# Patient Record
Sex: FEMALE | Race: WHITE | Employment: OTHER | URBAN - METROPOLITAN AREA
[De-identification: names, ages, dates, MRNs, and addresses within clinical notes are randomized per-mention and may not be internally consistent; named-entity substitution may affect disease eponyms.]

---

## 2017-03-22 ENCOUNTER — COMMUNICATION - HEALTHEAST (OUTPATIENT)
Dept: FAMILY MEDICINE | Facility: CLINIC | Age: 52
End: 2017-03-22

## 2017-04-03 ENCOUNTER — COMMUNICATION - HEALTHEAST (OUTPATIENT)
Dept: FAMILY MEDICINE | Facility: CLINIC | Age: 52
End: 2017-04-03

## 2017-04-06 ENCOUNTER — OFFICE VISIT - HEALTHEAST (OUTPATIENT)
Dept: FAMILY MEDICINE | Facility: CLINIC | Age: 52
End: 2017-04-06

## 2017-04-06 DIAGNOSIS — K61.2 ABSCESS OF ANAL AND RECTAL REGIONS: ICD-10-CM

## 2017-04-06 DIAGNOSIS — Z78.9 HEALTH CARE HOME, ACTIVE CARE COORDINATION: ICD-10-CM

## 2017-04-06 DIAGNOSIS — N82.3 RECTO-VAGINAL FISTULA: ICD-10-CM

## 2017-04-06 DIAGNOSIS — K21.00 REFLUX ESOPHAGITIS: ICD-10-CM

## 2017-04-06 DIAGNOSIS — Z12.31 OTHER SCREENING MAMMOGRAM: ICD-10-CM

## 2017-04-06 RX ORDER — IBUPROFEN 200 MG
200 TABLET ORAL EVERY 6 HOURS PRN
Status: SHIPPED | COMMUNITY
Start: 2017-04-06 | End: 2023-06-12

## 2017-04-06 ASSESSMENT — MIFFLIN-ST. JEOR: SCORE: 1566.78

## 2017-04-10 ENCOUNTER — COMMUNICATION - HEALTHEAST (OUTPATIENT)
Dept: FAMILY MEDICINE | Facility: CLINIC | Age: 52
End: 2017-04-10

## 2017-04-14 ENCOUNTER — HOSPITAL ENCOUNTER (OUTPATIENT)
Dept: MAMMOGRAPHY | Facility: CLINIC | Age: 52
Discharge: HOME | End: 2017-04-14
Attending: FAMILY MEDICINE

## 2017-04-14 DIAGNOSIS — Z12.31 OTHER SCREENING MAMMOGRAM: ICD-10-CM

## 2017-04-25 ENCOUNTER — COMMUNICATION - HEALTHEAST (OUTPATIENT)
Dept: FAMILY MEDICINE | Facility: CLINIC | Age: 52
End: 2017-04-25

## 2017-05-04 ENCOUNTER — OFFICE VISIT - HEALTHEAST (OUTPATIENT)
Dept: FAMILY MEDICINE | Facility: CLINIC | Age: 52
End: 2017-05-04

## 2017-05-04 DIAGNOSIS — R12 HEARTBURN: ICD-10-CM

## 2017-05-04 ASSESSMENT — MIFFLIN-ST. JEOR: SCORE: 1553.18

## 2017-06-12 ENCOUNTER — RECORDS - HEALTHEAST (OUTPATIENT)
Dept: ADMINISTRATIVE | Facility: OTHER | Age: 52
End: 2017-06-12

## 2017-06-13 ENCOUNTER — RECORDS - HEALTHEAST (OUTPATIENT)
Dept: ADMINISTRATIVE | Facility: OTHER | Age: 52
End: 2017-06-13

## 2017-12-19 ENCOUNTER — OFFICE VISIT - HEALTHEAST (OUTPATIENT)
Dept: FAMILY MEDICINE | Facility: CLINIC | Age: 52
End: 2017-12-19

## 2017-12-19 DIAGNOSIS — M77.9 TENDONITIS: ICD-10-CM

## 2018-01-15 ENCOUNTER — AMBULATORY - HEALTHEAST (OUTPATIENT)
Dept: FAMILY MEDICINE | Facility: CLINIC | Age: 53
End: 2018-01-15

## 2018-01-15 ENCOUNTER — COMMUNICATION - HEALTHEAST (OUTPATIENT)
Dept: FAMILY MEDICINE | Facility: CLINIC | Age: 53
End: 2018-01-15

## 2018-01-15 DIAGNOSIS — N95.0 POST-MENOPAUSAL BLEEDING: ICD-10-CM

## 2018-01-19 ENCOUNTER — RECORDS - HEALTHEAST (OUTPATIENT)
Dept: ADMINISTRATIVE | Facility: OTHER | Age: 53
End: 2018-01-19

## 2018-02-12 ENCOUNTER — OFFICE VISIT - HEALTHEAST (OUTPATIENT)
Dept: FAMILY MEDICINE | Facility: CLINIC | Age: 53
End: 2018-02-12

## 2018-02-12 DIAGNOSIS — Z01.818 PREOP EXAMINATION: ICD-10-CM

## 2018-02-12 DIAGNOSIS — N95.0 POST-MENOPAUSAL BLEEDING: ICD-10-CM

## 2018-02-12 LAB
ALBUMIN UR-MCNC: NEGATIVE MG/DL
APPEARANCE UR: CLEAR
BILIRUB UR QL STRIP: NEGATIVE
COLOR UR AUTO: YELLOW
ERYTHROCYTE [DISTWIDTH] IN BLOOD BY AUTOMATED COUNT: 14 % (ref 11–14.5)
GLUCOSE UR STRIP-MCNC: NEGATIVE MG/DL
HCT VFR BLD AUTO: 38 % (ref 35–47)
HGB BLD-MCNC: 12.8 G/DL (ref 12–16)
HGB UR QL STRIP: ABNORMAL
KETONES UR STRIP-MCNC: NEGATIVE MG/DL
LEUKOCYTE ESTERASE UR QL STRIP: NEGATIVE
MCH RBC QN AUTO: 27.3 PG (ref 27–34)
MCHC RBC AUTO-ENTMCNC: 33.6 G/DL (ref 32–36)
MCV RBC AUTO: 81 FL (ref 80–100)
NITRATE UR QL: NEGATIVE
PH UR STRIP: 6 [PH] (ref 5–8)
PLATELET # BLD AUTO: 367 THOU/UL (ref 140–440)
PMV BLD AUTO: 6.9 FL (ref 7–10)
RBC # BLD AUTO: 4.67 MILL/UL (ref 3.8–5.4)
SP GR UR STRIP: 1.02 (ref 1–1.03)
UROBILINOGEN UR STRIP-ACNC: ABNORMAL
WBC: 12.1 THOU/UL (ref 4–11)

## 2018-02-12 ASSESSMENT — MIFFLIN-ST. JEOR: SCORE: 1562.25

## 2018-02-13 LAB
ANION GAP SERPL CALCULATED.3IONS-SCNC: 11 MMOL/L (ref 5–18)
ATRIAL RATE - MUSE: 93 BPM
BUN SERPL-MCNC: 11 MG/DL (ref 8–22)
CALCIUM SERPL-MCNC: 9.5 MG/DL (ref 8.5–10.5)
CHLORIDE BLD-SCNC: 101 MMOL/L (ref 98–107)
CO2 SERPL-SCNC: 26 MMOL/L (ref 22–31)
CREAT SERPL-MCNC: 0.72 MG/DL (ref 0.6–1.1)
DIASTOLIC BLOOD PRESSURE - MUSE: NORMAL MMHG
GFR SERPL CREATININE-BSD FRML MDRD: >60 ML/MIN/1.73M2
GLUCOSE BLD-MCNC: 119 MG/DL (ref 70–125)
INTERPRETATION ECG - MUSE: NORMAL
P AXIS - MUSE: 24 DEGREES
POTASSIUM BLD-SCNC: 4.1 MMOL/L (ref 3.5–5)
PR INTERVAL - MUSE: 112 MS
QRS DURATION - MUSE: 76 MS
QT - MUSE: 364 MS
QTC - MUSE: 452 MS
R AXIS - MUSE: -14 DEGREES
SODIUM SERPL-SCNC: 138 MMOL/L (ref 136–145)
SYSTOLIC BLOOD PRESSURE - MUSE: NORMAL MMHG
T AXIS - MUSE: 34 DEGREES
VENTRICULAR RATE- MUSE: 93 BPM

## 2018-02-14 LAB — BACTERIA SPEC CULT: NORMAL

## 2018-02-26 ENCOUNTER — RECORDS - HEALTHEAST (OUTPATIENT)
Dept: ADMINISTRATIVE | Facility: OTHER | Age: 53
End: 2018-02-26

## 2018-04-23 ENCOUNTER — HOSPITAL ENCOUNTER (OUTPATIENT)
Dept: MAMMOGRAPHY | Facility: CLINIC | Age: 53
Discharge: HOME | End: 2018-04-23
Attending: FAMILY MEDICINE

## 2018-04-23 DIAGNOSIS — Z12.31 VISIT FOR SCREENING MAMMOGRAM: ICD-10-CM

## 2018-06-27 ENCOUNTER — COMMUNICATION - HEALTHEAST (OUTPATIENT)
Dept: FAMILY MEDICINE | Facility: CLINIC | Age: 53
End: 2018-06-27

## 2018-06-30 ENCOUNTER — AMBULATORY - HEALTHEAST (OUTPATIENT)
Dept: FAMILY MEDICINE | Facility: CLINIC | Age: 53
End: 2018-06-30

## 2018-07-14 ENCOUNTER — COMMUNICATION - HEALTHEAST (OUTPATIENT)
Dept: FAMILY MEDICINE | Facility: CLINIC | Age: 53
End: 2018-07-14

## 2018-07-14 DIAGNOSIS — K21.00 REFLUX ESOPHAGITIS: ICD-10-CM

## 2018-11-12 ENCOUNTER — AMBULATORY - HEALTHEAST (OUTPATIENT)
Dept: FAMILY MEDICINE | Facility: CLINIC | Age: 53
End: 2018-11-12

## 2018-11-12 ENCOUNTER — COMMUNICATION - HEALTHEAST (OUTPATIENT)
Dept: FAMILY MEDICINE | Facility: CLINIC | Age: 53
End: 2018-11-12

## 2018-12-13 ENCOUNTER — AMBULATORY - HEALTHEAST (OUTPATIENT)
Dept: FAMILY MEDICINE | Facility: CLINIC | Age: 53
End: 2018-12-13

## 2018-12-13 ENCOUNTER — COMMUNICATION - HEALTHEAST (OUTPATIENT)
Dept: FAMILY MEDICINE | Facility: CLINIC | Age: 53
End: 2018-12-13

## 2019-01-03 ENCOUNTER — AMBULATORY - HEALTHEAST (OUTPATIENT)
Dept: FAMILY MEDICINE | Facility: CLINIC | Age: 54
End: 2019-01-03

## 2019-01-03 ENCOUNTER — COMMUNICATION - HEALTHEAST (OUTPATIENT)
Dept: FAMILY MEDICINE | Facility: CLINIC | Age: 54
End: 2019-01-03

## 2019-01-03 DIAGNOSIS — K61.2 ABSCESS OF ANAL AND RECTAL REGIONS: ICD-10-CM

## 2019-01-06 ENCOUNTER — COMMUNICATION - HEALTHEAST (OUTPATIENT)
Dept: FAMILY MEDICINE | Facility: CLINIC | Age: 54
End: 2019-01-06

## 2019-01-06 DIAGNOSIS — K21.00 REFLUX ESOPHAGITIS: ICD-10-CM

## 2019-01-17 ENCOUNTER — COMMUNICATION - HEALTHEAST (OUTPATIENT)
Dept: FAMILY MEDICINE | Facility: CLINIC | Age: 54
End: 2019-01-17

## 2019-01-17 DIAGNOSIS — K61.2 ABSCESS OF ANAL AND RECTAL REGIONS: ICD-10-CM

## 2019-04-24 ENCOUNTER — COMMUNICATION - HEALTHEAST (OUTPATIENT)
Dept: FAMILY MEDICINE | Facility: CLINIC | Age: 54
End: 2019-04-24

## 2019-04-26 ENCOUNTER — COMMUNICATION - HEALTHEAST (OUTPATIENT)
Dept: FAMILY MEDICINE | Facility: CLINIC | Age: 54
End: 2019-04-26

## 2019-05-22 ENCOUNTER — OFFICE VISIT - HEALTHEAST (OUTPATIENT)
Dept: FAMILY MEDICINE | Facility: CLINIC | Age: 54
End: 2019-05-22

## 2019-05-22 DIAGNOSIS — K21.00 REFLUX ESOPHAGITIS: ICD-10-CM

## 2019-05-22 DIAGNOSIS — R05.9 COUGH: ICD-10-CM

## 2019-08-14 ENCOUNTER — COMMUNICATION - HEALTHEAST (OUTPATIENT)
Dept: FAMILY MEDICINE | Facility: CLINIC | Age: 54
End: 2019-08-14

## 2019-08-14 DIAGNOSIS — K61.2 ABSCESS OF ANAL AND RECTAL REGIONS: ICD-10-CM

## 2019-08-15 ENCOUNTER — AMBULATORY - HEALTHEAST (OUTPATIENT)
Dept: FAMILY MEDICINE | Facility: CLINIC | Age: 54
End: 2019-08-15

## 2019-08-15 DIAGNOSIS — K61.2 ABSCESS OF ANAL AND RECTAL REGIONS: ICD-10-CM

## 2019-08-19 ENCOUNTER — COMMUNICATION - HEALTHEAST (OUTPATIENT)
Dept: FAMILY MEDICINE | Facility: CLINIC | Age: 54
End: 2019-08-19

## 2019-08-19 DIAGNOSIS — K61.2 ABSCESS OF ANAL AND RECTAL REGIONS: ICD-10-CM

## 2019-12-04 ENCOUNTER — COMMUNICATION - HEALTHEAST (OUTPATIENT)
Dept: FAMILY MEDICINE | Facility: CLINIC | Age: 54
End: 2019-12-04

## 2019-12-04 DIAGNOSIS — K21.00 REFLUX ESOPHAGITIS: ICD-10-CM

## 2019-12-31 ENCOUNTER — COMMUNICATION - HEALTHEAST (OUTPATIENT)
Dept: FAMILY MEDICINE | Facility: CLINIC | Age: 54
End: 2019-12-31

## 2020-02-26 ENCOUNTER — OFFICE VISIT - HEALTHEAST (OUTPATIENT)
Dept: FAMILY MEDICINE | Facility: CLINIC | Age: 55
End: 2020-02-26

## 2020-02-26 DIAGNOSIS — K21.00 REFLUX ESOPHAGITIS: ICD-10-CM

## 2020-02-26 DIAGNOSIS — M79.641 PAIN OF RIGHT HAND: ICD-10-CM

## 2020-02-26 DIAGNOSIS — Z12.31 VISIT FOR SCREENING MAMMOGRAM: ICD-10-CM

## 2020-02-27 ENCOUNTER — AMBULATORY - HEALTHEAST (OUTPATIENT)
Dept: FAMILY MEDICINE | Facility: CLINIC | Age: 55
End: 2020-02-27

## 2020-02-27 ENCOUNTER — COMMUNICATION - HEALTHEAST (OUTPATIENT)
Dept: FAMILY MEDICINE | Facility: CLINIC | Age: 55
End: 2020-02-27

## 2020-02-27 DIAGNOSIS — K60.40 RECTAL FISTULA: ICD-10-CM

## 2020-08-31 ENCOUNTER — VIRTUAL VISIT (OUTPATIENT)
Dept: FAMILY MEDICINE | Facility: OTHER | Age: 55
End: 2020-08-31
Payer: COMMERCIAL

## 2020-08-31 PROCEDURE — 99421 OL DIG E/M SVC 5-10 MIN: CPT | Performed by: PHYSICIAN ASSISTANT

## 2020-08-31 NOTE — PROGRESS NOTES
"Date: 2020 08:51:36  Clinician: Marc Preston  Clinician NPI: [id number]  Patient: Rima De La Torre  Patient : 1965  Patient Address: [address] [place] [address]  Patient Phone: [phone]  Visit Protocol: Yeast infection  Patient Summary:  Rima is a 54 year old ( : 1965 ) female who initiated a Visit for a presumed vaginal yeast infection. When asked the question \"Please sign me up to receive news, health information and promotions from [employer].\", Rima responded \"No\".    Rima began noticing vaginal irritation 1-3 days ago.   She denies having blisters, vaginal pruritus, open sores, vaginal discharge, vaginal burning, and abdominal pain. She also denies feeling feverish.   She has not tried to treat her current symptoms with any medication.   Precipitating events  Rima denies having a sexually transmitted disease.   Pertinent medical history  Rima has a history of vaginal yeast infections. She has had one (1) occurrence in the past year and the current symptoms are similar to previous yeast infections. She has used fluconazole (Diflucan) to treat previous yeast infections. 2 doses of fluconazole (Diflucan) has typically been needed for symptoms to resolve in the past.  She prefers a pill. She denies taking antibiotics in the past 2 weeks.   She does NOT smoke or use smokeless tobacco.   Additional information as reported by the patient (free text): Possible recurring \"fistula\" for which, once I'm sure, I will take Cephalexin 500 mg, as prescribed by previous Dr. Yi. (now retired)      MEDICATIONS: Acid Reducer (omeprazole) oral, ALLERGIES: Sulfa (Sulfonamide Antibiotics)  Clinician Response:  Dear Rima,  Based on the information you have provided, you likely have a vaginal yeast infection which is a common infection of the vagina caused by a fungus. Yeast are a type of fungus.  The most common symptom of a yeast " infection is itching in and around the vagina. Other signs and symptoms include burning, redness, or a thick, white vaginal discharge that looks like cottage cheese and does not have a bad smell.  Medication information  I am prescribing:     Fluconazole (Diflucan) 150 mg oral tablet. Take 1 tablet by mouth in a single dose. Repeat dose in 3 days if symptoms are still present. There are no refills with this prescription.   Self care  Steps you can take to prevent symptoms of future vaginal yeast infection:     Avoid irritants such as scented bath products, tampons, pads, or vaginal sprays and powders    Avoid douching    Wear cotton underwear and if you are comfortable doing so, do not wear underwear to bed    Avoid hot tubs and whirlpool spas     When to seek care  Most women notice improvement in their symptoms within 1-2 days after starting treatment with complete clearing in 5-7 days.  Please make an appointment to be seen in a clinic or urgent care if any of the following occur:     Your symptoms have not improved in 3 days or not resolved in 10 days    You develop new symptoms or your symptoms become worse    If you think you may be at risk for an STD      Diagnosis: Candida vulvovaginitis  Diagnosis ICD: B37.3  Prescription: fluconazole (Diflucan) 150 mg oral tablet 2 tablet, 4 days supply. Take 1 tablet by mouth in a single dose, repeat dose in 3 days if symptoms are still present. Refills: 0, Refill as needed: no, Allow substitutions: yes  Pharmacy: [id number] - [phone] - [address] [id number]

## 2021-02-06 ENCOUNTER — COMMUNICATION - HEALTHEAST (OUTPATIENT)
Dept: FAMILY MEDICINE | Facility: CLINIC | Age: 56
End: 2021-02-06

## 2021-02-06 DIAGNOSIS — K21.00 REFLUX ESOPHAGITIS: ICD-10-CM

## 2021-02-22 ENCOUNTER — OFFICE VISIT - HEALTHEAST (OUTPATIENT)
Dept: FAMILY MEDICINE | Facility: CLINIC | Age: 56
End: 2021-02-22

## 2021-02-22 DIAGNOSIS — K21.00 GASTROESOPHAGEAL REFLUX DISEASE WITH ESOPHAGITIS WITHOUT HEMORRHAGE: ICD-10-CM

## 2021-02-22 DIAGNOSIS — E66.01 MORBID OBESITY (H): ICD-10-CM

## 2021-02-22 DIAGNOSIS — Z12.31 VISIT FOR SCREENING MAMMOGRAM: ICD-10-CM

## 2021-02-22 DIAGNOSIS — N93.9 VAGINAL SPOTTING: ICD-10-CM

## 2021-02-22 DIAGNOSIS — Z76.89 ESTABLISHING CARE WITH NEW DOCTOR, ENCOUNTER FOR: ICD-10-CM

## 2021-02-22 DIAGNOSIS — Z78.0 POSTMENOPAUSAL STATUS: ICD-10-CM

## 2021-02-22 DIAGNOSIS — K21.00 REFLUX ESOPHAGITIS: ICD-10-CM

## 2021-02-22 DIAGNOSIS — M25.512 ACUTE PAIN OF LEFT SHOULDER: ICD-10-CM

## 2021-02-22 ASSESSMENT — MIFFLIN-ST. JEOR: SCORE: 1525.96

## 2021-02-23 ENCOUNTER — COMMUNICATION - HEALTHEAST (OUTPATIENT)
Dept: FAMILY MEDICINE | Facility: CLINIC | Age: 56
End: 2021-02-23

## 2021-02-23 ENCOUNTER — AMBULATORY - HEALTHEAST (OUTPATIENT)
Dept: SCHEDULING | Facility: CLINIC | Age: 56
End: 2021-02-23

## 2021-02-23 DIAGNOSIS — N93.9 VAGINAL SPOTTING: ICD-10-CM

## 2021-02-23 DIAGNOSIS — Z12.31 VISIT FOR SCREENING MAMMOGRAM: ICD-10-CM

## 2021-02-23 DIAGNOSIS — Z78.0 POSTMENOPAUSAL STATUS: ICD-10-CM

## 2021-03-05 ENCOUNTER — COMMUNICATION - HEALTHEAST (OUTPATIENT)
Dept: FAMILY MEDICINE | Facility: CLINIC | Age: 56
End: 2021-03-05

## 2021-03-05 ENCOUNTER — HOSPITAL ENCOUNTER (OUTPATIENT)
Dept: MAMMOGRAPHY | Facility: CLINIC | Age: 56
Discharge: HOME | End: 2021-03-05
Attending: NURSE PRACTITIONER | Admitting: NURSE PRACTITIONER
Payer: COMMERCIAL

## 2021-03-05 DIAGNOSIS — K21.00 REFLUX ESOPHAGITIS: ICD-10-CM

## 2021-03-05 DIAGNOSIS — Z12.31 VISIT FOR SCREENING MAMMOGRAM: ICD-10-CM

## 2021-03-05 PROCEDURE — 77063 BREAST TOMOSYNTHESIS BI: CPT

## 2021-03-07 ENCOUNTER — HEALTH MAINTENANCE LETTER (OUTPATIENT)
Age: 56
End: 2021-03-07

## 2021-03-10 ENCOUNTER — RECORDS - HEALTHEAST (OUTPATIENT)
Dept: ADMINISTRATIVE | Facility: OTHER | Age: 56
End: 2021-03-10

## 2021-03-10 ENCOUNTER — RECORDS - HEALTHEAST (OUTPATIENT)
Dept: BONE DENSITY | Facility: CLINIC | Age: 56
End: 2021-03-10

## 2021-03-10 DIAGNOSIS — Z78.0 ASYMPTOMATIC MENOPAUSAL STATE: ICD-10-CM

## 2021-03-11 ENCOUNTER — HOSPITAL ENCOUNTER (OUTPATIENT)
Dept: ULTRASOUND IMAGING | Facility: CLINIC | Age: 56
Discharge: HOME | End: 2021-03-11
Attending: NURSE PRACTITIONER | Admitting: NURSE PRACTITIONER
Payer: COMMERCIAL

## 2021-03-11 ENCOUNTER — RECORDS - HEALTHEAST (OUTPATIENT)
Dept: ADMINISTRATIVE | Facility: OTHER | Age: 56
End: 2021-03-11

## 2021-03-11 DIAGNOSIS — N93.9 VAGINAL SPOTTING: ICD-10-CM

## 2021-03-11 DIAGNOSIS — Z78.0 POST-MENOPAUSAL: ICD-10-CM

## 2021-03-11 PROCEDURE — 76830 TRANSVAGINAL US NON-OB: CPT

## 2021-03-22 ENCOUNTER — COMMUNICATION - HEALTHEAST (OUTPATIENT)
Dept: FAMILY MEDICINE | Facility: CLINIC | Age: 56
End: 2021-03-22

## 2021-03-23 ENCOUNTER — COMMUNICATION - HEALTHEAST (OUTPATIENT)
Dept: FAMILY MEDICINE | Facility: CLINIC | Age: 56
End: 2021-03-23

## 2021-03-23 DIAGNOSIS — N95.0 ABNORMAL VAGINAL BLEEDING WITH ENDOMETRIAL THICKNESS GREATER THAN 5 MM PRESENT ON TRANSVAGINAL ULTRASOUND IN POSTMENOPAUSAL PATIENT: ICD-10-CM

## 2021-03-23 DIAGNOSIS — R93.89 ABNORMAL VAGINAL BLEEDING WITH ENDOMETRIAL THICKNESS GREATER THAN 5 MM PRESENT ON TRANSVAGINAL ULTRASOUND IN POSTMENOPAUSAL PATIENT: ICD-10-CM

## 2021-03-31 ENCOUNTER — RECORDS - HEALTHEAST (OUTPATIENT)
Dept: ADMINISTRATIVE | Facility: OTHER | Age: 56
End: 2021-03-31

## 2021-04-06 ENCOUNTER — AMBULATORY - HEALTHEAST (OUTPATIENT)
Dept: SURGERY | Facility: AMBULATORY SURGERY CENTER | Age: 56
End: 2021-04-06

## 2021-04-06 DIAGNOSIS — Z11.59 ENCOUNTER FOR SCREENING FOR OTHER VIRAL DISEASES: ICD-10-CM

## 2021-04-12 ENCOUNTER — OFFICE VISIT - HEALTHEAST (OUTPATIENT)
Dept: FAMILY MEDICINE | Facility: CLINIC | Age: 56
End: 2021-04-12

## 2021-04-12 DIAGNOSIS — Z01.818 PRE-OP EXAM: ICD-10-CM

## 2021-04-12 DIAGNOSIS — N95.0 POSTMENOPAUSAL BLEEDING: ICD-10-CM

## 2021-04-12 DIAGNOSIS — R93.89 ENDOMETRIAL THICKENING ON ULTRASOUND: ICD-10-CM

## 2021-04-12 LAB
ANION GAP SERPL CALCULATED.3IONS-SCNC: 12 MMOL/L (ref 5–18)
BUN SERPL-MCNC: 11 MG/DL (ref 8–22)
CALCIUM SERPL-MCNC: 9.2 MG/DL (ref 8.5–10.5)
CHLORIDE BLD-SCNC: 104 MMOL/L (ref 98–107)
CO2 SERPL-SCNC: 22 MMOL/L (ref 22–31)
CREAT SERPL-MCNC: 0.67 MG/DL (ref 0.6–1.1)
ERYTHROCYTE [DISTWIDTH] IN BLOOD BY AUTOMATED COUNT: 13.3 % (ref 11–14.5)
GFR SERPL CREATININE-BSD FRML MDRD: >60 ML/MIN/1.73M2
GLUCOSE BLD-MCNC: 102 MG/DL (ref 70–125)
HCT VFR BLD AUTO: 38.3 % (ref 35–47)
HGB BLD-MCNC: 13.1 G/DL (ref 12–16)
MCH RBC QN AUTO: 29.4 PG (ref 27–34)
MCHC RBC AUTO-ENTMCNC: 34.2 G/DL (ref 32–36)
MCV RBC AUTO: 86 FL (ref 80–100)
PLATELET # BLD AUTO: 306 THOU/UL (ref 140–440)
PMV BLD AUTO: 8.9 FL (ref 7–10)
POTASSIUM BLD-SCNC: 4.4 MMOL/L (ref 3.5–5)
RBC # BLD AUTO: 4.46 MILL/UL (ref 3.8–5.4)
SODIUM SERPL-SCNC: 138 MMOL/L (ref 136–145)
WBC: 8.9 THOU/UL (ref 4–11)

## 2021-04-12 ASSESSMENT — MIFFLIN-ST. JEOR: SCORE: 1516.89

## 2021-04-28 ENCOUNTER — ANESTHESIA - HEALTHEAST (OUTPATIENT)
Dept: SURGERY | Facility: AMBULATORY SURGERY CENTER | Age: 56
End: 2021-04-28

## 2021-04-29 ENCOUNTER — RECORDS - HEALTHEAST (OUTPATIENT)
Dept: ADMINISTRATIVE | Facility: OTHER | Age: 56
End: 2021-04-29

## 2021-04-30 LAB
HPV SOURCE: NORMAL
HUMAN PAPILLOMA VIRUS 16 DNA: NEGATIVE
HUMAN PAPILLOMA VIRUS 18 DNA: NEGATIVE
HUMAN PAPILLOMA VIRUS FINAL DIAGNOSIS: NORMAL
HUMAN PAPILLOMA VIRUS OTHER HR: NEGATIVE
SPECIMEN DESCRIPTION: NORMAL

## 2021-05-04 ENCOUNTER — RECORDS - HEALTHEAST (OUTPATIENT)
Dept: FAMILY MEDICINE | Facility: CLINIC | Age: 56
End: 2021-05-04

## 2021-05-05 ENCOUNTER — RECORDS - HEALTHEAST (OUTPATIENT)
Dept: ADMINISTRATIVE | Facility: OTHER | Age: 56
End: 2021-05-05

## 2021-05-05 LAB
BKR LAB AP ABNORMAL BLEEDING: YES
BKR LAB AP BIRTH CONTROL/HORMONES: NORMAL
BKR LAB AP CERVICAL APPEARANCE: NORMAL
BKR LAB AP GYN ADEQUACY: NORMAL
BKR LAB AP GYN INTERPRETATION: NORMAL
BKR LAB AP HPV REFLEX: NORMAL
BKR LAB AP LMP: NORMAL
BKR LAB AP PATIENT STATUS: NO
BKR LAB AP PREVIOUS ABNORMAL: NORMAL
BKR LAB AP PREVIOUS NORMAL: NORMAL
HIGH RISK?: NO
PATH REPORT.COMMENTS IMP SPEC: NORMAL
RESULT FLAG (HE HISTORICAL CONVERSION): NORMAL

## 2021-05-11 ENCOUNTER — RECORDS - HEALTHEAST (OUTPATIENT)
Dept: ADMINISTRATIVE | Facility: OTHER | Age: 56
End: 2021-05-11

## 2021-05-29 NOTE — PROGRESS NOTES
Chief Complaint   Patient presents with     Cough     Pt thinks her cough is related to her omeprazole. She states the cough makes her feel breathless       HPI: 53-year-old female with past medical history of esophagitis and history of PPI use presents today with cough.  She is had a chronic cough for 2 months duration of mild intensity.  There is been no sputum production, no fever.  No rhinorrhea.  She does not have seasonal allergies.  She feels that the Prilosec that she is taking is no longer effective.    Review of endoscopy from 6/12/2017 shows that she had esophagitis.    ROS: No fever no rhinorrhea no vomiting no hemoptysis or hematemesis    SH:    reports that she has never smoked. She has never used smokeless tobacco. She reports that she does not drink alcohol or use drugs.      FH: The Patient's family history includes Aortic aneurysm in her brother; Coronary artery disease in her maternal grandfather and maternal uncle; Depression in her maternal grandfather; Diabetes in her brother, maternal aunt, and mother; Heart disease in her mother; Hypertension in her mother; Hypothyroidism in her mother; Neuropathy in her sister; Other in her brother; Schizophrenia in her brother; Sudden death (age of onset: 51) in her brother; Supraventricular tachycardia in her sister.     Meds:  Nadiya has a current medication list which includes the following prescription(s): aspirin, ibuprofen, omeprazole, celecoxib, and omeprazole.    O:  /80   Pulse 100   Temp 98.5  F (36.9  C)   Resp 16   LMP 04/14/2012 (Approximate)   SpO2 99%   Constitutional:    --Vitals as above  --No acute distress  Eyes-  --Sclera noninjected  --Lids and conjunctiva normal  ENT-  --TMs clear  --Sclera noninjected  --Pharynx not erythematous  Neck-  --Neck supple    Lymph-  --No cervical lymphadenopathy  Lungs-  --Clear to Auscultation  Heart-  --Regular rate and rhythm  Skin-  --Pink and dry          A/P:   1. Chronic Reflux  Esophagitis  Patient most likely has chronic reflux and recommended increasing Prilosec to 20 mg twice daily.  Alternatively we could try different brand of PPI however she would like to try this.  If not improving she will return and discuss with Dr. iY.  - omeprazole (PRILOSEC) 20 MG capsule; Take 1 capsule (20 mg total) by mouth 2 (two) times a day before meals.  Dispense: 180 capsule; Refill: 1    2. Cough  Symptomatic treatment only.  If not improving would consider referral to ENT.

## 2021-05-30 VITALS — WEIGHT: 225 LBS | HEIGHT: 63 IN | BODY MASS INDEX: 39.87 KG/M2

## 2021-05-30 VITALS — WEIGHT: 222 LBS | HEIGHT: 63 IN | BODY MASS INDEX: 39.34 KG/M2

## 2021-05-31 VITALS — HEIGHT: 63 IN | WEIGHT: 224 LBS | BODY MASS INDEX: 39.69 KG/M2

## 2021-05-31 VITALS — HEIGHT: 63 IN | BODY MASS INDEX: 39.96 KG/M2

## 2021-05-31 NOTE — TELEPHONE ENCOUNTER
Medication Request  Medication name: Cephalexin 500 mg  Pharmacy Name and Location: [facility]  Reason for request: Abscess or Fistula - patient stated she has been treated for this in the past.  When did you use medication last?:  01/2019  Patient offered appointment:  patient declined  Okay to leave a detailed message: yes  [phone]      Patient is requesting this to be addressed as soon as possible.  Patient is also requesting a call when this prescription has been sent to her pharmacy.

## 2021-05-31 NOTE — TELEPHONE ENCOUNTER
RN cannot approve Refill Request    RN can NOT refill this medication med is not covered by policy/route to provider. Last office visit: 5/4/2017 Kiera Yi MD Last Physical: 2/12/2018 Last MTM visit: Visit date not found Last visit same specialty: 5/22/2019 Irving Bai MD.  Next visit within 3 mo: Visit date not found  Next physical within 3 mo: Visit date not found      Zakiya Ovalle, [facility] Triage/Med Refill 8/14/2019    Requested Prescriptions   Pending Prescriptions Disp Refills     cephalexin (KEFLEX) 500 MG capsule [Pharmacy Med Name: Cephalexin Oral Capsule 500 MG] 40 capsule 0     Sig: Take 1 capsule (500 mg total) by mouth 4 times a day for 10 days.       There is no refill protocol information for this order

## 2021-05-31 NOTE — TELEPHONE ENCOUNTER
RN cannot approve Refill Request    RN can NOT refill this medication med is not covered by policy/route to provider. Last office visit: 5/4/2017 Kiera Yi MD Last Physical: 2/12/2018 Last MTM visit: Visit date not found Last visit same specialty: 5/22/2019 Irving Bai MD.  Next visit within 3 mo: Visit date not found  Next physical within 3 mo: Visit date not found      Tessy Santos, [facility] Triage/Med Refill 8/19/2019    Requested Prescriptions   Pending Prescriptions Disp Refills     fluconazole (DIFLUCAN) 150 MG tablet [Pharmacy Med Name: Fluconazole Oral Tablet 150 MG] 1 tablet 0     Sig: Take one tablet by mouth now.  May repeat in one week if needed.       There is no refill protocol information for this order

## 2021-06-04 VITALS — OXYGEN SATURATION: 100 % | DIASTOLIC BLOOD PRESSURE: 80 MMHG | HEART RATE: 87 BPM | SYSTOLIC BLOOD PRESSURE: 128 MMHG

## 2021-06-04 NOTE — TELEPHONE ENCOUNTER
Refill Approved    Rx renewed per Medication Renewal Policy. Medication was last renewed on 1/7/2019.    Gonzalo Jackson, [facility] Triage/Med Refill 12/5/2019     Requested Prescriptions   Pending Prescriptions Disp Refills     omeprazole (PRILOSEC) 20 MG capsule [Pharmacy Med Name: Omeprazole Oral Capsule Delayed Release 20 MG] 180 capsule 0     Sig: Take 1 capsule (20 mg total) by mouth 2 times a day before meals.       GI Medications Refill Protocol Passed - 12/4/2019  7:01 AM        Passed - PCP or prescribing provider visit in last 12 or next 3 months.     Last office visit with prescriber/PCP: 5/22/2019 Irving Bai MD OR same dept: 5/22/2019 Irving aBi MD OR same specialty: 5/22/2019 Irving Bai MD  Last physical: Visit date not found Last MTM visit: Visit date not found   Next visit within 3 mo: Visit date not found  Next physical within 3 mo: Visit date not found  Prescriber OR PCP: Irving Bai MD  Last diagnosis associated with med order: 1. Chronic Reflux Esophagitis  - omeprazole (PRILOSEC) 20 MG capsule [Pharmacy Med Name: Omeprazole Oral Capsule Delayed Release 20 MG]; Take 1 capsule (20 mg total) by mouth 2 times a day before meals.  Dispense: 180 capsule; Refill: 0    If protocol passes may refill for 12 months if within 3 months of last provider visit (or a total of 15 months).

## 2021-06-04 NOTE — TELEPHONE ENCOUNTER
RN cannot approve Refill Request    RN can NOT refill this medication med is not covered by policy/route to provider. Last office visit: 5/4/2017 Kiera Yi MD Last Physical: 2/12/2018 Last MTM visit: Visit date not found Last visit same specialty: 5/22/2019 Irving Bai MD.  Next visit within 3 mo: Visit date not found  Next physical within 3 mo: Visit date not found      Zakiya Ovalle, [facility] Triage/Med Refill 1/1/2020    Requested Prescriptions   Pending Prescriptions Disp Refills     celecoxib (CELEBREX) 200 MG capsule [Pharmacy Med Name: Celecoxib Oral Capsule 200 MG] 14 capsule 0     Sig: TAKE ONE CAPSULE BY MOUTH ONE TIME DAILY WITH FOOD       There is no refill protocol information for this order

## 2021-06-04 NOTE — TELEPHONE ENCOUNTER
Patient has not been seen in follow-up for this medication for several years.  She would need to be seen prior to any further refills of this medication.    I will forward the refill request to Dr. Yi who is out of the office until next Monday.  If she has a differing opinion on this refill she certainly will contact patient at that time.

## 2021-06-05 VITALS
DIASTOLIC BLOOD PRESSURE: 78 MMHG | SYSTOLIC BLOOD PRESSURE: 132 MMHG | WEIGHT: 216 LBS | HEART RATE: 87 BPM | HEIGHT: 63 IN | BODY MASS INDEX: 38.27 KG/M2 | OXYGEN SATURATION: 99 % | RESPIRATION RATE: 18 BRPM

## 2021-06-05 VITALS
WEIGHT: 214 LBS | HEIGHT: 63 IN | RESPIRATION RATE: 18 BRPM | HEART RATE: 89 BPM | SYSTOLIC BLOOD PRESSURE: 124 MMHG | BODY MASS INDEX: 37.92 KG/M2 | TEMPERATURE: 98.7 F | OXYGEN SATURATION: 99 % | DIASTOLIC BLOOD PRESSURE: 86 MMHG

## 2021-06-06 NOTE — PROGRESS NOTES
PROGRESS NOTE       SUBJECTIVE:  Nadiya De La Torre is a 54 y.o. female   Chief Complaint   Patient presents with     Medication Refill     med check and refills      Nadiya is here for medication check.  She would like a refill of the cephalexin which she uses only if she gets a rectal infection from the small anal fistula.  She has not required treatment for this for a long time.  Otherwise she has been well.  We talked about stopping the aspirin daily.  She does take Celebrex 200 mg for her hand pain.  She is seeing the hand surgeon.  Her stomach has been fine.  She takes omeprazole daily.    Patient Active Problem List   Diagnosis     Obesity     Chronic Reflux Esophagitis     Prediabetes     Vaginal Pain     Perirectal Abscess     Post-menopausal bleeding     Other screening mammogram     Recto-vaginal fistula       Current Outpatient Medications   Medication Sig Dispense Refill     aspirin 81 MG EC tablet Take 1 tablet (81 mg total) by mouth daily.  0     omeprazole (PRILOSEC) 20 MG capsule TAKE 1 CAPSULE BY MOUTH ONCE DAILY BEFORE A MEAL 90 capsule 3     celecoxib (CELEBREX) 200 MG capsule Take 1 capsule (200 mg total) by mouth daily. Always take with food. 60 capsule 0     cephalexin (KEFLEX) 500 MG capsule Take 1 capsule (500 mg total) by mouth 4 (four) times a day. 40 capsule 0     fluconazole (DIFLUCAN) 150 MG tablet Take one tablet by mouth now.  May repeat in one week if needed. 1 tablet 0     ibuprofen (ADVIL,MOTRIN) 200 MG tablet Take 200 mg by mouth every 6 (six) hours as needed for pain.       mv-mn/folic ac/calcium/vit K1 (WOMEN'S 50 PLUS MULTIVITAMIN ORAL) Take by mouth.       No current facility-administered medications for this visit.        Social History     Tobacco Use   Smoking Status Never Smoker   Smokeless Tobacco Never Used       REVIEW OF SYSTEMS:  Patient denies fever, chills, dizziness, headache, visual change, ear pain, cough, chest pain, shortness of breath, abdominal pain, extremity  pain or swelling, rash,  depression or anxiety.    OBJECTIVE:       Vitals:    02/26/20 1342   BP: 128/80   Pulse: 87   SpO2: 100%     No data recorded  Wt Readings from Last 3 Encounters:   02/12/18 (!) 224 lb (101.6 kg)   05/04/17 222 lb (100.7 kg)   04/06/17 (!) 225 lb (102.1 kg)     There is no height or weight on file to calculate BMI.        Physical Exam:  GENERAL APPEARANCE: A&A, NAD, well hydrated, well nourished  SKIN:  Normal skin turgor, no lesions/rashes   EARS: TM's normal, gray with nl light reflex  OROPHARYNX: without erythema, no post nasal drainage or thrush  NECK: Supple, without lymphadenopathy, no thyroid mass  CV: RRR, no M/G/R   LUNGS: CTAB, normal respiratory effort  PSYCHIATRIC:  Mood appropriate, memory intact        ASSESSMENT/PLAN:     1. Visit for screening mammogram  Yearly mammogram is advised    2. Pain of right hand  I refilled the Celebrex.  She will follow-up with a hand surgeon if needed.  - celecoxib (CELEBREX) 200 MG capsule; Take 1 capsule (200 mg total) by mouth daily. Always take with food.  Dispense: 60 capsule; Refill: 0    3. Chronic Reflux Esophagitis    - omeprazole (PRILOSEC) 20 MG capsule; TAKE 1 CAPSULE BY MOUTH ONCE DAILY BEFORE A MEAL  Dispense: 90 capsule; Refill: 3      There are no Patient Instructions on file for this visit.  Medications Discontinued During This Encounter   Medication Reason     omeprazole (PRILOSEC) 20 MG capsule Duplicate order     celecoxib (CELEBREX) 200 MG capsule Reorder     omeprazole (PRILOSEC) 20 MG capsule Reorder     No follow-ups on file.    I spent a total of 25 minutes face to face with the patient.  Over 50% of the time spent counseling and educating the patient about all of the above.      Kiera Yi MD

## 2021-06-09 NOTE — PROGRESS NOTES
ASSESSMENT/PLAN:     1. Recto-vaginal fistula  This has been very difficult to identify.  She has been able to manage for years by knowing the symptoms and treating with Cephalexin.  We talked about this today.  I will provide another prescription for her to have on hand for this.  If she needs to use it, she will call in and let us know so we can help her keep track.  If at any time it doesn't seem to work, or her symptoms change she should be seen.    - cephalexin (KEFLEX) 500 MG capsule; Take 1 capsule (500 mg total) by mouth 4 (four) times a day for 10 days.  Dispense: 40 capsule; Refill: 0    2. Other screening mammogram    - Mammo Screening Bilateral; Future      3. Chronic Reflux Esophagitis  Will try ranitidine for a while to get off the chronic omeprazole.    - ranitidine (ZANTAC) 300 MG tablet; Take 1 tablet (300 mg total) by mouth at bedtime.  Dispense: 90 tablet; Refill: 3    5. Health care home, active care coordination      There are no Patient Instructions on file for this visit.  Medications Discontinued During This Encounter   Medication Reason     aspirin 81 MG EC tablet Reorder     Kiera Yi MD        CHIEF COMPLAINT  Chief Complaint   Patient presents with     Vaginal Pain     possible bacteria infection      Medication Refill     med check        HPI:  Nadiya De La Torre is a 51 y.o. female presenting to the clinic today with vaginal pain. She reports vaginal irritation beginning about four days ago. She states that this felt very uncomfortable, and similar to when she had problems from her fistula in October 2016. She believes that she may have a bacterial infection. She began doing sitz baths and feels that her symptoms have improved since she made this appointment.     Chronic Reflux Esophagitis: She was not able to receive a refill of her omeprazole medication from the pharmacy recently because she is overdue for a physical. She would like a refill of this prescription today. She  continues taking this medication regularly, and has been for many years. She does not take this medication every day, but becomes uncomfortable without taking it regularly. She has tried over the counter medication in the past.       Arthritis: She has been visiting [facility] for knee pain and has recently found that she has osteoarthritis of the knee. She has been taking NSAIDs as needed for pain relief.     Health Maintenance: She is planning to schedule her mammogram. She has not yet had a colonoscopy. She is due for a tetanus vaccine.       REVIEW OF SYSTEMS:    She has been taking an 81 mg of aspirin daily. All other systems negative.     PSFH:  Her brother  of a heart attack. She has gotten yeast infections from cephalexin in the past.    Patient Active Problem List   Diagnosis     Obesity     Chronic Reflux Esophagitis     Prediabetes     Vaginal Pain     Perirectal Abscess     Post-menopausal bleeding     Other screening mammogram     Recto-vaginal fistula       TOBACCO USE:  History   Smoking Status     Never Smoker   Smokeless Tobacco     Not on file     Social History     Social History     Marital status:      Spouse name: Ming     Number of children: 0     Years of education: N/A     Occupational History      School [employer]     Social History Main Topics     Smoking status: Never Smoker     Smokeless tobacco: Not on file     Alcohol use No     Drug use: No     Sexual activity: Yes     Partners: Male     Other Topics Concern     Not on file     Social History Narrative       OBJECTIVE:       Vitals:    17 0945   BP: 128/88   Pulse: 78     Weight: 225 lb (102.1 kg)  Wt Readings from Last 3 Encounters:   17 (!) 225 lb (102.1 kg)   09/18/15 219 lb (99.3 kg)   08/31/15 (!) 223 lb (101.2 kg)     Body mass index is 40.5 kg/(m^2).      Physical Exam:  GENERAL APPEARANCE: A&A, NAD, well hydrated, well nourished  PSYCHIATRIC;  Mood appropriate, memory intact      Kiera Unger  MD [place]    ADDITIONAL HISTORY SUMMARIZED (2): None.  DECISION TO OBTAIN EXTRA INFORMATION (1): None.   RADIOLOGY TESTS (1): None.  LABS (1): None.  MEDICINE TESTS (1): None.  INDEPENDENT REVIEW (2 each): None.     The visit lasted a total of 17 minutes face to face with the patient. Over 50% of the time was spent counseling and educating the patient about vaginal pain.    I, Faviola Schulz, am scribing for and in the presence of, Dr. Yi.    I, Dr. Yi, personally performed the services described in this documentation, as scribed by Faviola Schulz in my presence, and it is both accurate and complete.       MEDICATIONS   Current Outpatient Prescriptions   Medication Sig Dispense Refill     aspirin 81 MG EC tablet Take 1 tablet (81 mg total) by mouth daily.  0     ibuprofen (ADVIL,MOTRIN) 200 MG tablet Take 200 mg by mouth every 6 (six) hours as needed for pain.       omeprazole (PRILOSEC) 20 MG capsule TAKE 1 CAPSULE (20 MG) BY ORAL ROUTE ONCE DAILY BEFORE A MEAL 90 capsule 4     aspirin 81 MG EC tablet Take 1 tablet (81 mg total) by mouth daily. 150 tablet 2     cephalexin (KEFLEX) 500 MG capsule Take 1 capsule (500 mg total) by mouth 4 (four) times a day for 10 days. 40 capsule 0     fluconazole (DIFLUCAN) 150 MG tablet Take one tablet by mouth now.  May repeat in one week if needed. 1 tablet 1     ranitidine (ZANTAC) 300 MG tablet Take 1 tablet (300 mg total) by mouth at bedtime. 90 tablet 3     No current facility-administered medications for this visit.          Total data points: 0

## 2021-06-10 NOTE — PROGRESS NOTES
PROGRESS NOTE       SUBJECTIVE:  Nadiya De La Torre is a 51 y.o. female   Chief Complaint   Patient presents with     Follow-up     med check , new meds not working      Patient has had heartburn for over 10 years.  Because she had been on prolonged omeprazole I recommended that she go off of it and use Zantac 300 mg daily as needed.  Patient has done this but finds that Zantac does not work for her at all.  She gets no relief from it and her heartburn is back on a daily basis.  She denies any nausea or vomiting and has never had any bleeding from the GI tract.  We did she did raise the head of her bed thinking this would be helpful but she has not really noted any improvement.    She is not a smoker, drinks alcohol rarely, 1 cup of coffee daily, she does not take aspirin but does take ibuprofen for pain at times.    Patient Active Problem List   Diagnosis     Obesity     Chronic Reflux Esophagitis     Prediabetes     Vaginal Pain     Perirectal Abscess     Post-menopausal bleeding     Other screening mammogram     Recto-vaginal fistula       Current Outpatient Prescriptions   Medication Sig Dispense Refill     aspirin 81 MG EC tablet Take 1 tablet (81 mg total) by mouth daily. 150 tablet 2     ranitidine (ZANTAC) 300 MG tablet Take 1 tablet (300 mg total) by mouth at bedtime. 90 tablet 3     aspirin 81 MG EC tablet Take 1 tablet (81 mg total) by mouth daily.  0     fluconazole (DIFLUCAN) 150 MG tablet Take one tablet by mouth now.  May repeat in one week if needed. 1 tablet 1     ibuprofen (ADVIL,MOTRIN) 200 MG tablet Take 200 mg by mouth every 6 (six) hours as needed for pain.       omeprazole (PRILOSEC) 20 MG capsule TAKE 1 CAPSULE (20 MG) BY ORAL ROUTE ONCE DAILY BEFORE A MEAL 90 capsule 4     No current facility-administered medications for this visit.        History   Smoking Status     Never Smoker   Smokeless Tobacco     Not on file       REVIEW OF SYSTEMS:  Patient denies fever, chills, dizziness, headache,  visual change, cough, chest pain, shortness of breath, edema.     OBJECTIVE:       Vitals:    05/04/17 1305   BP: 118/82   Pulse: 82     Weight: 222 lb (100.7 kg)  Wt Readings from Last 3 Encounters:   05/04/17 222 lb (100.7 kg)   04/06/17 (!) 225 lb (102.1 kg)   09/18/15 219 lb (99.3 kg)     Body mass index is 39.96 kg/(m^2).        Physical Exam:  GENERAL APPEARANCE: A&A, NAD, well hydrated, well nourished    PSYCHIATRIC;  Mood appropriate, memory intact    Otherwise not examined today.    ASSESSMENT/PLAN:     1. Heartburn  Persistent heartburn off medication.  Refractory to Zantac but controlled with PPI.  I recommend GI consult for upper endoscopy given the chronic nature of her problem.  Patient voices understanding and will resume omeprazole 20 mg daily pending the GI evaluation.  - Ambulatory referral for Upper GI Endoscopy      There are no Patient Instructions on file for this visit.  Medications Discontinued During This Encounter   Medication Reason     omeprazole (PRILOSEC) 20 MG capsule Reorder     Return for After her GI procedure..    The visit lasted a total of 10 minutes face to face with the patient.  Over 50% of the time spent counseling and educating the patient about all of the above.      Kiera Yi MD

## 2021-06-14 ENCOUNTER — COMMUNICATION - HEALTHEAST (OUTPATIENT)
Dept: FAMILY MEDICINE | Facility: CLINIC | Age: 56
End: 2021-06-14

## 2021-06-14 DIAGNOSIS — M25.512 ACUTE PAIN OF LEFT SHOULDER: ICD-10-CM

## 2021-06-14 RX ORDER — CELECOXIB 200 MG/1
CAPSULE ORAL
Qty: 60 CAPSULE | Refills: 0 | Status: SHIPPED | OUTPATIENT
Start: 2021-06-14 | End: 2022-06-07

## 2021-06-14 NOTE — PROGRESS NOTES
DATE OF SERVICE: 12/19/2017    SUBJECTIVE:  Very pleasant 52-year-old lady presents today with pain on the thenar  eminence of both upper extremities.  It has been occurring for the past week.   Incidentally, she was tasked over approximately the last 10 days or so to produce a  catalog at her place of work in record time and she spent an inordinate amount of  time the computer typing.  She was resting her hands and arms on the keyboard during  this time.  She also has been baking a lot of [employer] cookies and using a dose  .    REVIEW OF SYSTEMS:  No distal paresthesias.  No history of trauma.  No fever or  erythema.    SOCIAL HISTORY:  She does not smoke.    OBJECTIVE:  Pulse 99, respirations 16, blood pressure 120/70.  Examination of both  upper extremities shows pain to palpation over the thenar eminence bilaterally.   Normal distal neurological and vascular function in both distal upper extremities.   The wrist has normal flexion, extension.  The elbows are normal bilaterally.    ASSESSMENT:  Tendinitis.    PLAN:    1.  Celebrex 200 mg daily.  2.  Cockup splints bilaterally.  3.  Return if not improving.      TIKI JUAREZ MD  ca  D 12/19/2017 15:59:29  T 12/19/2017 16:22:59  R 12/19/2017 16:22:59  [id number]        cc:TAMMY JUAERZ MD

## 2021-06-15 PROBLEM — Z12.31 OTHER SCREENING MAMMOGRAM: Status: ACTIVE | Noted: 2017-04-06

## 2021-06-15 PROBLEM — N82.3 RECTO-VAGINAL FISTULA: Status: ACTIVE | Noted: 2017-04-07

## 2021-06-15 NOTE — TELEPHONE ENCOUNTER
Pt scheduled for establish care with Ting on 2/22/2021      .  Medication: Omeprazole   Last Date Filled: 2/26/2020  Last appointment addressing medication: 2/26/2020  Last B/P:  BP Readings from Last 3 Encounters:   02/26/20 128/80   05/22/19 128/80   02/12/18 118/80     Last labs pertaining to refill:2/12/2018      Pend medication and associate diagnosis before routing to Provider for review.       If patient has not been seen in over 1 year, pend 30 day supply and notify patient they are due for an appointment before any further refills.

## 2021-06-15 NOTE — TELEPHONE ENCOUNTER
RN cannot approve Refill Request    RN can NOT refill this medication No established PCP. Last office visit: 2/26/2020 Kiera Yi MD Last Physical: 2/12/2018 Last MTM visit: Visit date not found Last visit same specialty: 2/26/2020 Kiera Yi MD.  Next visit within 3 mo: Visit date not found  Next physical within 3 mo: Visit date not found      Jaja Mcfarland, [facility] Triage/Med Refill 2/6/2021    Requested Prescriptions   Pending Prescriptions Disp Refills     omeprazole (PRILOSEC) 20 MG capsule [Pharmacy Med Name: Omeprazole Oral Capsule Delayed Release 20 MG] 90 capsule 0     Sig: TAKE 1 CAPSULE BY MOUTH ONE TIME DAILY BEFORE A MEAL       GI Medications Refill Protocol Passed - 2/6/2021  2:00 AM        Passed - PCP or prescribing provider visit in last 12 or next 3 months.     Last office visit with prescriber/PCP: 2/26/2020 Kiera Yi MD OR same dept: 2/26/2020 Kiera Yi MD OR same specialty: 2/26/2020 Kiera Yi MD  Last physical: 2/12/2018 Last MTM visit: Visit date not found   Next visit within 3 mo: Visit date not found  Next physical within 3 mo: Visit date not found  Prescriber OR PCP: Kiera Yi MD  Last diagnosis associated with med order: 1. Chronic Reflux Esophagitis  - omeprazole (PRILOSEC) 20 MG capsule [Pharmacy Med Name: Omeprazole Oral Capsule Delayed Release 20 MG]; TAKE 1 CAPSULE BY MOUTH ONE TIME DAILY BEFORE A MEAL  Dispense: 90 capsule; Refill: 0    If protocol passes may refill for 12 months if within 3 months of last provider visit (or a total of 15 months).

## 2021-06-15 NOTE — PROGRESS NOTES
1. Gastroesophageal reflux disease with esophagitis without hemorrhage     2. Postmenopausal status  DXA Bone Density Scan    US Pelvis With Transvaginal Non OB   3. Vaginal spotting  US Pelvis With Transvaginal Non OB   4. Acute pain of left shoulder  celecoxib (CELEBREX) 200 MG capsule   5. Chronic Reflux Esophagitis  omeprazole (PRILOSEC) 20 MG capsule   6. Visit for screening mammogram  Mammo Screening Bilateral   7. Obesity (BMI 35.0-39.9) with comorbidity (H)     8. Establishing care with new doctor, encounter for           Assessment & Plan     Gastroesophageal reflux disease with esophagitis without hemorrhage  Well controlled on current Prilosec dosing    Postmenopausal status    - DXA Bone Density Scan  - US Pelvis With Transvaginal Non OB   She is taking calcium 600 mg daily and vitamin D 800 units daily for osteoporosis prevention at this time.  Did discuss the importance of weightbearing exercises specifically in postmenopausal women.  Avoid smoking    Vaginal spotting    - US Pelvis With Transvaginal Non OB  Seen by [facility] in the past for bleeding concerns, her provider left the clinic that she was being seen at and previous PCP, Dr. Yi is retired  History of rectal fistula which she treats with Keflex PRN, she does require use of oral Diflucan for yeast infections related to antibiotic use.  Typically takes 2 doses.  She will let me know through my chart if she ever needs refills of the Keflex and the Diflucan in the instance that she develops a flareup of her fistula.  There was some discussion regarding how to proceed with correction of her fistula but she never went in for colonoscopy or any further follow-up to discuss surgical correction.  She has been dealing with fistula flareups roughly every 12 to 18 months since that time.    Acute pain of left shoulder    - celecoxib (CELEBREX) 200 MG capsule  Dispense: 60 capsule; Refill: 0  Discussed use of heat or ice packs to the affected area 1-2  "times per day  Activity as tolerated, we discussed range of motion exercises that she could perform at home.    Chronic Reflux Esophagitis    - omeprazole (PRILOSEC) 20 MG capsule  Dispense: 90 capsule; Refill: 0  Well-controlled on her current dose of Prilosec    Visit for screening mammogram    - Mammo Screening Bilateral  We discussed regular BSE at home, she has not been good about doing this    Obesity (BMI 35.0-39.9) with comorbidity (H)    Discussed importance of regular exercise specifically weightbearing exercises in postmenopausal women.  She states that she is walking several days a week anywhere from 30 to 70 minutes per time.  She is taking calcium 600 mg daily and vitamin D 800 units daily for osteoporosis prevention    Establishing care with new doctor, encounter for    She is well overdue for an annual physical with Pap smear and fasting lab work, her last set of labs were completed in 2018.  She states she does have a fear of needles  We did discuss need for vaccination in regards to her shingles.  All of her questions were addressed today regarding the Covid vaccines.      56 minutes spent on the date of the encounter doing chart review, review of test results, interpretation of tests, patient visit and documentation        BMI:   Estimated body mass index is 38.88 kg/m  as calculated from the following:    Height as of this encounter: 5' 2.5\" (1.588 m).    Weight as of this encounter: 216 lb (98 kg).   The following high BMI interventions were performed this visit: encouragement to exercise, weight monitoring, exercise promotion: strength training, exercise promotion: stretching and exercise promotion: walking/step counting    Return in about 2 months (around 4/22/2021) for annual physical, pap smear, fasting lab work, shingles vaccine.    Ting Poole NP  [facility]   Nadiya De La Torre is 55 y.o. and presents today for the following health issues: " establish care  HPI medical, family and surgical history reviewed.  Mother is , she passed away in 2020 due to diabetes and CHF complications.  Mother also had a history of kidney disease and thyroid disease.  Patient is , 's name is Ming.  They have no children.  She is now retired, she retired early.  She does drink 1-2 alcoholic beverages weekly, she is a non-smoker and denies any drug use.  She does eat 3 meals per day plus snacks, current BMI 38.8.  She does take walks 4 to 6 days a week for 30 to 70 minutes at a time.  Last menstrual period was in .  She does report vaginal spotting for the last week, she has been seen by [facility] in the past for similar concerns.  No history of a hysterectomy.  She is overdue for her annual physical with Pap smear, she is due for her mammogram, bone scan and colonoscopy.  Her last colonoscopy was in , she is taking calcium 600 mg daily and vitamin D 800 units daily for osteoporosis prevention.  She is overdue for shingles and influenza vaccines.     She is overdue for vision and dental appointments, she does wear glasses.  Heartburn is well controlled on her current dose of Prilosec.  She does have a history of rectal fistula, she was seen by colorectal specialty for this and there was some discussion about surgical repair, however, patient did not follow-up to have this completed.  She now deals with flareups of her fistula every 12 to 18 months which are typically treated with Keflex, she does like to have a prescription on hand.  She does develop vaginal yeast infections with antibiotic use and treats those with oral Diflucan.    Anxiety/depression: She has never been on medication, seeing a therapist and has never dealt with any suicidal ideation.  Overall, she feels she handles her anxiety and depressive symptoms well.  Most recently, her main stressors include family issues with her brother and sister's mental health.  Brother is a  "paranoid schizophrenic, her sister recently moved in with her daughter who is age 91 and living alone since the death of his wife.  Her sister has had some labile mood swings and will yell at their father which is concerning to the patient.  She feels that she has a lot of her plate in regards to taking care of her father and following up with her brother and sister's mental health problems.  Coping strategies include taking walks, meditation and listening to audiobooks.   is supportive.    Left shoulder pain:  onset: September 2020, she went to lay on the bed on her stomach and when she went to change positions, she has had left shoulder pain ever since.  She describes it as a dull ache with occasional sharp pains that is intermittent.  She does notice the pain anytime she raises her arms over her head, changes her shirt, lays on her left side or tries done snap her bra.  She has not noticed any significant swelling, denies any numbness or tingling, no radiation of pain down the arm.  She has been treating her discomfort with Tylenol or ibuprofen as needed.  She is rating her pain today a 1 out of 10.          Review of Systems        Objective    /78   Pulse 87   Resp 18   Ht 5' 2.5\" (1.588 m)   Wt 216 lb (98 kg)   LMP 04/14/2012 (Approximate)   SpO2 99%   Breastfeeding No   BMI 38.88 kg/m    Body mass index is 38.88 kg/m .  Physical Exam      Review of Systems     Denies fever, chills, visual changes, fatigue, nasal congestion, rhinorrhea, ear pain, or discharge, sore throat, swollen glands, breast mass, nipple discharge, breast changes, abdominal pain, cough, shortness of breath, chest pain, weight change, change in bowel habits, melena, rectal bleeding, dysuria, frequency, urgency, hematuria, polyuria, polydipsia, polyphagia,  swelling or erythema, edema, rash, weakness, paresthesias, vaginal discharge        Objective:         /78   Pulse 87   Resp 18   Ht 5' 2.5\" (1.588 m)   Wt " 216 lb (98 kg)   LMP 04/14/2012 (Approximate)   SpO2 99%   Breastfeeding No   BMI 38.88 kg/m       Physical Exam:  General Appearance: Alert, cooperative, no distress, appears stated age  Lungs: Clear to auscultation bilaterally, respirations unlabored  Heart: Regular rate and rhythm, S1 and S2 normal, no murmur, rub, or gallop  Abdomen: Soft, non-tender, bowel sounds active all four quadrants,  no masses, no organomegaly  Extremities: Extremities normal, atraumatic, no cyanosis or edema.  Patient elicits pain in the left shoulder with direct palpation of the left AC joint.  She does have full range of motion today.  Skin: Skin color, texture, turgor normal, no rashes or lesions, warm and dry

## 2021-06-16 PROBLEM — R93.89 ENDOMETRIAL THICKENING ON ULTRASOUND: Status: ACTIVE | Noted: 2021-04-12

## 2021-06-16 PROBLEM — E66.01 MORBID OBESITY (H): Status: ACTIVE | Noted: 2021-02-22

## 2021-06-16 PROBLEM — M25.512 ACUTE PAIN OF LEFT SHOULDER: Status: ACTIVE | Noted: 2021-02-22

## 2021-06-16 PROBLEM — N93.9 VAGINAL SPOTTING: Status: ACTIVE | Noted: 2021-02-22

## 2021-06-16 NOTE — PROGRESS NOTES
[facility]  [address]  [place] [address]  Dept: [phone]  Dept Fax: [phone]  Primary Provider: Mike Poole, NP  Pre-op Performing Provider: MIKE POOLE      PREOPERATIVE EVALUATION:  Today's date: 4/12/2021    Nadiya De La Torre is a 55 y.o. female who presents for a preoperative evaluation.    Surgical Information:  Surgery/Procedure:   Surgery Location: *EXAM UNDER ANESTHESIA, PAPANCOLAOU SMEAR, HYSTEROSCOPY, WITH DILATION AND CURETTAGE  Surgeon: Dr. Duff  Surgery Date: 04/29/21  Time of Surgery: 830am  Where patient plans to recover: At home with family  Fax number for surgical facility: Note does not need to be faxed, will be available electronically in Epic.    Type of Anesthesia Anticipated: General    Assessment & Plan      The proposed surgical procedure is considered LOW risk.    Pre-op exam    - HM2(CBC w/o Differential)  - Basic Metabolic Panel  She has elected to have her COVID testing done outside of [place] system, she is trying to find a facility that does the saliva testing   She will have her results faxed to her surgery team once received prior to her surgery on 4/29/21    Postmenopausal bleeding    - HM2(CBC w/o Differential)  - Basic Metabolic Panel  Bleeding has lessened since her initial OB consult in March    Endometrial thickening on ultrasound    - HM2(CBC w/o Differential)  - Basic Metabolic Panel  Pap smear, hysteroscopy and D & C will be completed during her procedure         Risks and Recommendations:  The patient has the following additional risks and recommendations for perioperative complications:   - No identified additional risk factors other than previously addressed    Medication Instructions:  Patient received a list of medications to hold prior to procedure so she is aware of what needs to be held    RECOMMENDATION:  APPROVAL GIVEN to proceed with proposed procedure, without  further diagnostic evaluation.    Review of external notes as documented above           40 minutes spent on the date of the encounter doing chart review, review of outside records, review of test results, interpretation of tests, patient visit and documentation         Subjective     HPI related to upcoming procedure: Patient presents in today for preop exam due to her postmenopausal bleeding.  She was found to have an abnormal heterogeneous and echogenic thickened endometrium measuring 6 mm with an additional small area of outpouching along the posterior fundus measuring 4 mm on her recent pelvic ultrasound which was completed in March.  She did complete her initial consult with OB specialist who recommended completing her Pap smear with hysteroscopy and D&C.  She does note that the bleeding has been reduced since her initial consult with OB/GYN, she still has a little bit of vaginal irritation from the recent ultrasound.  She will be completing her Covid testing at an outside facility, she is trying to find a facility that does the saliva testing in order to avoid the nasal swab.  She is aware of the timeframe that her surgery team will need the results by.    Preop Questions 4/7/2021   Have you ever had a heart attack or stroke? No   Have you ever had surgery on your heart or blood vessels, such as a stent placement, a coronary artery bypass, or surgery on an artery in your head, neck, heart, or legs? No   Do you have chest pain with activity? No   Do you have a history of  heart failure? No   Do you currently have a cold, bronchitis or symptoms of other infection? No   Do you have a cough, shortness of breath, or wheezing? No   Do you or anyone in your family have previous history of blood clots? No   Do you or does anyone in your family have a serious bleeding problem such as prolonged bleeding following surgeries or cuts? No   Have you ever had problems with anemia or been told to take iron pills? No   Have  you had any abnormal blood loss such as black, tarry or bloody stools, or abnormal vaginal bleeding? YES - postmenopausal spotting   Have you ever had a blood transfusion? No   Are you willing to have a blood transfusion if it is medically needed before, during, or after your surgery? Yes   Have you or any of your relatives ever had problems with anesthesia? YES - father, hard to wake up   Do you have sleep apnea, excessive snoring or daytime drowsiness? No   Do you have any artifical heart valves or other implanted medical devices like a pacemaker, defibrillator, or continuous glucose monitor? No   Do you have artificial joints? No   Are you allergic to latex? No   Is there any chance that you may be pregnant? No     Health Care Directive:  Patient does not have a Health Care Directive or Living Will: Patient states has Advance Directive and will bring in a copy to clinic. Working on this at home with     Preoperative Review of :    reviewed - no record of controlled substances prescribed.        Review of Systems  CONSTITUTIONAL: NEGATIVE for fever, chills, change in weight  INTEGUMENTARY/SKIN: NEGATIVE for worrisome rashes, moles or lesions  EYES: NEGATIVE for vision changes or irritation  ENT/MOUTH: NEGATIVE for ear, mouth and throat problems  RESP: NEGATIVE for significant cough or SOB  BREAST: NEGATIVE for masses, tenderness or discharge  CV: NEGATIVE for chest pain, palpitations or peripheral edema  GI: NEGATIVE for nausea, abdominal pain, heartburn, or change in bowel habits  : NEGATIVE for frequency, dysuria, or hematuria  MUSCULOSKELETAL: NEGATIVE for significant arthralgias or myalgia  NEURO: NEGATIVE for weakness, dizziness or paresthesias  ENDOCRINE: NEGATIVE for temperature intolerance, skin/hair changes  PSYCHIATRIC: NEGATIVE for changes in mood or affect    Patient Active Problem List    Diagnosis Date Noted     Endometrial thickening on ultrasound 04/12/2021     Obesity (BMI  35.0-39.9) with comorbidity (H) 02/22/2021     Vaginal spotting 02/22/2021     Acute pain of left shoulder 02/22/2021     Recto-vaginal fistula 04/07/2017     Other screening mammogram 04/06/2017     Postmenopausal bleeding 06/12/2015     Obesity      Chronic Reflux Esophagitis      Prediabetes      Vaginal Pain      Perirectal Abscess      Past Medical History:   Diagnosis Date     Abscess of Bartholin's gland      Fibrocystic breast      Past Surgical History:   Procedure Laterality Date     TX DRAIN PILONIDAL CYST SIMPL       Incision And Drainage Of Pilonidal Cyst, Simple;  Recorded: 01/16/2010;     TX I&D BARTHOLIN GLAND ABSCESS  12/01/2001     TX REMV PILONIDAL LESION SIMPLE      Pilonidal Cyst Resection;  Recorded: 01/16/2010;     WISDOM TOOTH EXTRACTION       Current Outpatient Medications   Medication Sig Dispense Refill     aspirin 81 MG EC tablet Take 1 tablet (81 mg total) by mouth daily.  0     calcium carbonate/vitamin D3 (CALCIUM 600 + D,3, ORAL) Take by mouth.       celecoxib (CELEBREX) 200 MG capsule Take 1 capsule (200 mg total) by mouth daily. Always take with food. 60 capsule 0     ibuprofen (ADVIL,MOTRIN) 200 MG tablet Take 200 mg by mouth every 6 (six) hours as needed for pain.       mv-mn/folic ac/calcium/vit K1 (WOMEN'S 50 PLUS MULTIVITAMIN ORAL) Take by mouth.       omeprazole (PRILOSEC) 20 MG capsule TAKE 1 CAPSULE BY MOUTH ONE TIME DAILY BEFORE A MEAL 90 capsule 3     cephalexin (KEFLEX) 500 MG capsule Take 1 capsule (500 mg total) by mouth 4 (four) times a day. 40 capsule 0     fluconazole (DIFLUCAN) 150 MG tablet Take one tablet by mouth now.  May repeat in one week if needed. 1 tablet 0     No current facility-administered medications for this visit.        Allergies   Allergen Reactions     Sulfa (Sulfonamide Antibiotics)        Social History     Tobacco Use     Smoking status: Never Smoker     Smokeless tobacco: Never Used   Substance Use Topics     Alcohol use: No      Family  "History   Problem Relation Age of Onset     Aortic aneurysm Brother         PERLA     Diabetes Brother      Other Brother         LOWER BACK PAIN  ,BACK FUSION, BROTHER GIRISH     Schizophrenia Brother      Sudden death Brother 51     Coronary artery disease Maternal Grandfather         MI IN 60'S     Depression Maternal Grandfather      Coronary artery disease Maternal Uncle          IN 50'S     Diabetes Maternal Aunt          AGE 65 COMPLICATIONS OF PNEUMONIA     Diabetes Mother      Heart disease Mother         stent placed age 74     Hypertension Mother      Hypothyroidism Mother      Neuropathy Sister         peripheral neuropathy , depression. aneurysm of brainstem , Zanda     Supraventricular tachycardia Sister         ablation at age 45      Social History     Substance and Sexual Activity   Drug Use No        Objective     /86   Pulse 89   Temp 98.7  F (37.1  C)   Resp 18   Ht 5' 2.5\" (1.588 m)   Wt 214 lb (97.1 kg)   LMP 2012 (Approximate)   SpO2 99%   Breastfeeding No   BMI 38.52 kg/m    Physical Exam    GENERAL APPEARANCE: healthy, alert and no distress     EYES: EOMI, PERRL     HENT: ear canals and TM's normal and nose and mouth without ulcers or lesions     NECK: no adenopathy, no asymmetry, masses, or scars and thyroid normal to palpation     RESP: lungs clear to auscultation - no rales, rhonchi or wheezes     CV: regular rates and rhythm, normal S1 S2, no S3 or S4 and no murmur, click or rub, no LE swelling, pulses intact     ABDOMEN:  Soft, large abdomen, nontender, no HSM or masses and bowel sounds normal     MS: extremities normal- no gross deformities noted, no evidence of inflammation in joints, FROM in all extremities.     SKIN: no suspicious lesions or rashes     NEURO: Normal strength and tone, sensory exam grossly normal, mentation intact and speech normal     PSYCH: mentation appears normal. and affect normal/bright     LYMPHATICS: No cervical adenopathy    No " results for input(s): HGB, PLT, INR, NA, K, CREATININE, HBA1C in the last 79939 hours.     PRE-OP Diagnostics:   Recent Results (from the past 240 hour(s))   HM2(CBC w/o Differential)    Collection Time: 04/12/21  2:28 PM   Result Value Ref Range    WBC 8.9 4.0 - 11.0 thou/uL    RBC 4.46 3.80 - 5.40 mill/uL    Hemoglobin 13.1 12.0 - 16.0 g/dL    Hematocrit 38.3 35.0 - 47.0 %    MCV 86 80 - 100 fL    MCH 29.4 27.0 - 34.0 pg    MCHC 34.2 32.0 - 36.0 g/dL    RDW 13.3 11.0 - 14.5 %    Platelets 306 140 - 440 thou/uL    MPV 8.9 7.0 - 10.0 fL   Basic Metabolic Panel    Collection Time: 04/12/21  2:28 PM   Result Value Ref Range    Sodium 138 136 - 145 mmol/L    Potassium 4.4 3.5 - 5.0 mmol/L    Chloride 104 98 - 107 mmol/L    CO2 22 22 - 31 mmol/L    Anion Gap, Calculation 12 5 - 18 mmol/L    Glucose 102 70 - 125 mg/dL    Calcium 9.2 8.5 - 10.5 mg/dL    BUN 11 8 - 22 mg/dL    Creatinine 0.67 0.60 - 1.10 mg/dL    GFR MDRD Af Amer >60 >60 mL/min/1.73m2    GFR MDRD Non Af Amer >60 >60 mL/min/1.73m2     No EKG required, no history of coronary heart disease, significant arrhythmia, peripheral arterial disease or other structural heart disease.    REVISED CARDIAC RISK INDEX (RCRI)   The patient has the following serious cardiovascular risks for perioperative complications:   - No serious cardiac risks = 0 points    RCRI INTERPRETATION: 0 points: Class I (very low risk - 0.4% complication rate)         Signed Electronically by: Ting Poole NP    Copy of this evaluation report is provided to requesting physician.

## 2021-06-16 NOTE — TELEPHONE ENCOUNTER
First attempt to reach patient. Callback requested to discuss pelvic ultrasound results.  Callback number provided.

## 2021-06-16 NOTE — PROGRESS NOTES
"Preoperative Exam    Scheduled Procedure: hysteroscopy and D&C   Surgery Date:  2/26/18   Surgery Location: [facility]     Surgeon:  Dr Aristides NGUYEN    Assessment/Plan:     1. Preop examination  Normal exam  - Basic Metabolic Panel  - Urinalysis Macroscopic  - Culture, Urine  - Electrocardiogram Perform - Clinic    2. Post-menopausal bleeding    - HM2(CBC w/o Differential)        Surgical Procedure Risk: Low (reported cardiac risk generally < 1%)  Have you had prior anesthesia?: No  Have you or any family members had a previous anesthesia reaction:  No  Do you or any family members have a history of a clotting or bleeding disorder?: No  Cardiac Risk Assessment: no increased risk for major cardiac complications    Functional Status: Independent  Patient plans to recover at home with family.     Patient is approved for surgery with local/general anesthetic.  Kiera Yi MD         Subjective:      Nadiya De La Torre is a 52 y.o. female who presents for a preoperative consultation.  This is the second time she has had postmenopausal bleeding.  She underwent a D&C with Dr. Emily Messina in October 2016.  \"I'm writing to let you know that I've started experiencing a couple bouts of vaginal bleeding again.   On JANUARY 4, 2018, I woke up to a fair amount of blood on a pad I wore. I wore this pad because a couple days prior I had some slight spotting and drops in the toilet of blood.  After my morning ablutions, it felt like I passed a blood clot in the toilet. The heavier blood on January 4th lasted just that day. Slight spotting after for a day or two then nothing.     Then, on JANUARY 12th, 2018, I had another somewhat heavy bleeding bout in the morning again. This past weekend it started to ween off to just spotting and a couple drops of blood when urinating. \"    Dr. Noelle Irving has evaluated patient.  Ultrasound shows thickening of the lining but no specific polyp.  She is recommended diagnostic " hysteroscopy and D&C.  Patient states that her questions are answered and she has no further concerns.      Pertinent History  Do you have difficulty breathing or chest pain after walking up a flight of stairs: No  History of obstructive sleep apnea: No  Steroid use in the last 6 months: No  Frequent Aspirin/NSAID use: Yes, 81 mg daily  Prior Blood Transfusion: No  Prior Blood Transfusion Reaction: No  If for some reason prior to, during or after the procedure, if it is medically indicated, would you be willing to have a blood transfusion?:  There is no transfusion refusal.    Current Outpatient Prescriptions   Medication Sig Dispense Refill     aspirin 81 MG EC tablet Take 1 tablet (81 mg total) by mouth daily.  0     omeprazole (PRILOSEC) 20 MG capsule TAKE 1 CAPSULE (20 MG) BY ORAL ROUTE ONCE DAILY BEFORE A MEAL 90 capsule 4     fluconazole (DIFLUCAN) 150 MG tablet Take one tablet by mouth now.  May repeat in one week if needed. 1 tablet 1     ibuprofen (ADVIL,MOTRIN) 200 MG tablet Take 200 mg by mouth every 6 (six) hours as needed for pain.       ranitidine (ZANTAC) 300 MG tablet Take 1 tablet (300 mg total) by mouth at bedtime. 90 tablet 3     No current facility-administered medications for this visit.         Allergies   Allergen Reactions     Sulfa (Sulfonamide Antibiotics)        Patient Active Problem List   Diagnosis     Obesity     Chronic Reflux Esophagitis     Prediabetes     Vaginal Pain     Perirectal Abscess     Post-menopausal bleeding     Other screening mammogram     Recto-vaginal fistula       Past Medical History:   Diagnosis Date     Abscess of Bartholin's gland      Fibrocystic breast        Past Surgical History:   Procedure Laterality Date     NM DRAIN PILONIDAL CYST SIMPL       Incision And Drainage Of Pilonidal Cyst, Simple;  Recorded: 01/16/2010;     NM I&D BARTHOLIN GLAND ABSCESS  12/01/2001     NM REMV PILONIDAL LESION SIMPLE      Pilonidal Cyst Resection;  Recorded: 01/16/2010;      "WISDOM TOOTH EXTRACTION         Social History     Social History     Marital status:      Spouse name: Ming     Number of children: 0     Years of education: N/A     Occupational History      [employer]     Social History Main Topics     Smoking status: Never Smoker     Smokeless tobacco: Not on file     Alcohol use No     Drug use: No     Sexual activity: Yes     Partners: Male     Other Topics Concern     Not on file     Social History Narrative       Patient Care Team:  Kiera Yi MD as PCP - General  Noelle Irving MD as Physician (Obstetrics and Gynecology)          Objective:     Vitals:    02/12/18 1606   BP: 118/80   Pulse: (!) 108   Temp: 98.4  F (36.9  C)   SpO2: 99%   Weight: (!) 224 lb (101.6 kg)   Height: 5' 2.5\" (1.588 m)   LMP: 04/14/2012         Physical Exam:    Recent Health:  Fever: no  Chills: no  Fatigue: no  Chest Pain: no  Cough: no  Dyspnea: no  Urinary Frequency: no  Nausea: no  Vomiting: no  Diarrhea: no  Abdominal Pain: no  Easy Bruising: no  Lower Extremity Swelling: no  Poor Exercise Tolerance: no    Pertinent History:  Prior Anesthesia: yes  Previous Anesthesia Reaction:  no  Diabetes: no  Cardiovascular Disease: no  Pulmonary Disease: no  Renal Disease: no  GI Disease: no  Sleep Apnea: no  Thromboembolic Problems: no  Clotting Disorder: no  Bleeding Disorder: no  Transfusion Reaction: no  Impaired Immunity: no  Steroid use in the last 6 months: no  Frequent Aspirin use: yes, daily 81 mg, but will stop one week before surgery    Review of Systems, Patient denies:    CONST: Fevers, chills, sweats, fatigue, appetite or weight change, temperature intolerance  EYES: Double vision, blurry vision, pain, redness  ENT: Drainage, congestion, nosebleeds, sinus problems, sores on lips/mouth/tongue/throat, dental work, change in voice  RESP: Cough, shortness of breath, wheezing, asthma  BREAST/SKIN: Lumps, pain, drainage sores, rash  CVS: Chest pain, heart murmur, DVT, " "PE, edema, palpitations  GI: Swallowing difficulties, heartburn, abdominal pain, nausea, vomiting, diarrhea, constipation, black or bloody stools, hemorrhoids  URINARY: Problems urinating, frequent urination  REPRODUCTIVE:   discharge, sore, pregnancies, postmenopausal, hysterectomy, contraception  MSK: Joint pain, swelling, stiffness, back or neck pain  ENDO: Changes in hair/skin, excessive thirst, urination, diabetes  LYMPH/HEME: Other masses, swelling, unusual bruising or bleeding  NEURO: Headache, head injury, blackout, confusion, seizure, difficulty with vision, speech, walking, weakness in arms/feet/face  MENTAL HEALTH: Anxiety, depression, insomnia, abuse    POSITIVES: Abnormal vaginal bleeding    Review of Systems, patient denies:     Denies fever, chills, visual changes, fatigue, myalgias, nasal congestion, rhinorrhea, ear pain, or discharge, sore throat, swollen glands, breast mass, nipple discharge, breast changes, abdominal pain, cough, shortness of breath, chest pain, weight change, change in bowel habits, melena, rectal bleeding, dysuria, frequency, urgency, hematuria, polyuria, polydipsia, polyphagia,  erythema, edema, rash, weakness, paresthesias, or mood changes.       Objective:         /80  Pulse (!) 108  Temp 98.4  F (36.9  C)  Ht 5' 2.5\" (1.588 m)  Wt (!) 224 lb (101.6 kg)  LMP 04/14/2012 (Approximate)  SpO2 99%  Breastfeeding? No  BMI 40.32 kg/m2     Physical Exam:  General Appearance: Alert, cooperative, no distress, appears stated age  Head: Normocephalic, without obvious abnormality, atraumatic  Eyes: PERRL, conjunctiva/corneas clear, EOM's intact  Ears: Normal TM's and external ear canals, both ears  Nose: Nares normal, septum midline,mucosa normal, no drainage  Throat: Lips, mucosa, and tongue normal; teeth and gums normal  Neck: Supple, symmetrical, trachea midline, no adenopathy;  thyroid: not enlarged, symmetric, no tenderness/mass/nodules; no carotid bruit or JVD  Back: " Symmetric, no curvature, ROM normal, no CVA tenderness  Lungs: Clear to auscultation bilaterally, respirations unlabored  Breasts: deferred  Heart: Regular rate and rhythm, S1 and S2 normal, no murmur, rub, or gallop  Abdomen: Soft, non-tender, bowel sounds active all four quadrants,  no masses, no organomegaly  Genital: deferred  Rectal: internal exam is deferred.    Extremities: Extremities normal, atraumatic, no cyanosis or edema  Skin: Skin color, texture, turgor normal, no rashes or lesions  Lymph nodes: Cervical, supraclavicular, and axillary nodes normal  Neurologic: Normal       Labs:  Recent Results (from the past 240 hour(s))   Electrocardiogram Perform - Clinic   Result Value Ref Range    SYSTOLIC BLOOD PRESSURE  mmHg    DIASTOLIC BLOOD PRESSURE  mmHg    VENTRICULAR RATE 93 BPM    ATRIAL RATE 93 BPM    P-R INTERVAL 112 ms    QRS DURATION 76 ms    Q-T INTERVAL 364 ms    QTC CALCULATION (BEZET) 452 ms    P Axis 24 degrees    R AXIS -14 degrees    T AXIS 34 degrees    MUSE DIAGNOSIS       Normal sinus rhythm  Normal ECG  No previous ECGs available  Confirmed by CLINTON LOCO MD LOC: ([id number]) on 2/13/2018 7:45:34 AM     HM2(CBC w/o Differential)   Result Value Ref Range    WBC 12.1 (H) 4.0 - 11.0 thou/uL    RBC 4.67 3.80 - 5.40 mill/uL    Hemoglobin 12.8 12.0 - 16.0 g/dL    Hematocrit 38.0 35.0 - 47.0 %    MCV 81 80 - 100 fL    MCH 27.3 27.0 - 34.0 pg    MCHC 33.6 32.0 - 36.0 g/dL    RDW 14.0 11.0 - 14.5 %    Platelets 367 140 - 440 thou/uL    MPV 6.9 (L) 7.0 - 10.0 fL   Urinalysis Macroscopic   Result Value Ref Range    Color, UA Yellow Colorless, Yellow, Straw, Light Yellow    Clarity, UA Clear Clear    Glucose, UA Negative Negative    Bilirubin, UA Negative Negative    Ketones, UA Negative Negative    Specific Gravity, UA 1.020 1.005 - 1.030    Blood, UA Small (!) Negative    pH, UA 6.0 5.0 - 8.0    Protein, UA Negative Negative mg/dL    Urobilinogen, UA 0.2 E.U./dL 0.2 E.U./dL, 1.0 E.U./dL     Nitrite, UA Negative Negative    Leukocytes, UA Negative Negative           Immunization History   Administered Date(s) Administered     MMR 01/01/1996     Td, Adult, Absorbed 04/06/2017     Tdap 05/10/2006           Electronically signed by Kiera Yi MD 02/12/18 4:01 PM

## 2021-06-16 NOTE — TELEPHONE ENCOUNTER
Spoke with patient regarding the recent results of her pelvic ultrasound which showed an abnormally thickened endometrium of 6 mm.  This has increased by 1 mm from her last pelvic ultrasound in 2015.  She does currently have a simple cyst on the ovary and the uterus is measuring normal in size.  Referral order was placed for her to go see the gynecology specialist to discuss options for endometrial biopsy.

## 2021-06-17 NOTE — ANESTHESIA POSTPROCEDURE EVALUATION
Patient: Nadiya De La Torre  Procedure(s):  EXAM UNDER ANESTHESIA, PAPANCOLAOU SMEAR, HYSTEROSCOPY, WITH DILATION AND CURETTAGE  Anesthesia type: MAC    Patient location: Phase II Recovery  Last vitals:   Vitals Value Taken Time   /78 04/29/21 0957   Temp 36.4  C (97.5  F) 04/29/21 0852   Pulse 93 04/29/21 0947   Resp 16 04/29/21 0936   SpO2 96 % 04/29/21 0947   Vitals shown include unvalidated device data.  Post vital signs: stable  Level of consciousness: awake and responds to simple questions  Post-anesthesia pain: pain controlled  Post-anesthesia nausea and vomiting: no  Pulmonary: unassisted, return to baseline  Cardiovascular: stable and blood pressure at baseline  Hydration: adequate  Anesthetic events: no    QCDR Measures:  ASA# 11 - Rosette-op Cardiac Arrest: ASA11B - Patient did NOT experience unanticipated cardiac arrest  ASA# 12 - Rosette-op Mortality Rate: ASA12B - Patient did NOT die  ASA# 13 - PACU Re-Intubation Rate: NA - No ETT / LMA used for case  ASA# 10 - Composite Anes Safety: ASA10A - No serious adverse event    Additional Notes:

## 2021-06-17 NOTE — ANESTHESIA CARE TRANSFER NOTE
Last vitals:   Vitals:    04/29/21 0852   BP: 129/71   Pulse: (!) 108   Resp: 16   Temp: 36.4  C (97.5  F)   SpO2: 95%     Patient's level of consciousness is drowsy  Spontaneous respirations: yes  Maintains airway independently: yes  Dentition unchanged: yes  Oropharynx: oropharynx clear of all foreign objects    QCDR Measures:  ASA# 20 - Surgical Safety Checklist: WHO surgical safety checklist completed prior to induction    PQRS# 430 - Adult PONV Prevention: 4558F - Pt received => 2 anti-emetic agents (different classes) preop & intraop  ASA# 8 - Peds PONV Prevention: NA - Not pediatric patient, not GA or 2 or more risk factors NOT present  PQRS# 424 - Rosette-op Temp Management: 4559F - At least one body temp DOCUMENTED => 35.5C or 95.9F within required timeframe  PQRS# 426 - PACU Transfer Protocol: - Transfer of care checklist used  ASA# 14 - Acute Post-op Pain: ASA14B - Patient did NOT experience pain >= 7 out of 10

## 2021-06-17 NOTE — TELEPHONE ENCOUNTER
"Telephone Encounter by Tessy Okeefe CMA at 3/5/2021 11:08 AM     Author: Tessy Okeefe CMA Service: -- Author Type: Certified Medical Assistant    Filed: 3/5/2021 11:15 AM Encounter Date: 3/5/2021 Status: Signed    : Tessy Okeefe CMA (Certified Medical Assistant)       Per [employer] message ( listed below) pt requesting 1 years worth of refills on omperazole  Pt had est care visit with on on 21 & most recent refill was sent for 90 days supply with no refills on 21   Please adv if you are ok to refill for a year.  Per est care visit pt is due back for phy & pap around 21    More Detail >>   FW: Prescription   Severson, Tammie F, LPN   Sent: u 2021  3:34 PM   To: [facility] Care Team Pool    Nadiya De La Torre   MRN: [id number] : 1965   Pt Home: [phone]     Entered: [phone]        Message       ----- Message -----   From: Nadiya De La Torre   Sent: 3/4/2021   3:23 PM CST   To: [facility] Support Team Pool   Subject: Prescription                                       Topic: Procedural Question      TO:  Lorna FRASER            Just picked up my prescription for Omeprazole, a 90 day supply. It said \"No Refills\". I usually have refills to last a year supply.       Prescription    Severson, Tammie F, LPN routed conversation to Ting Poole Care Team [place] 19 hours ago (3:35 PM)      Nadiya De La Torre  Patient Customer Service Request Pool 19 hours ago (3:23 PM)        Topic: Procedural Question     TO:  Lorna FRASER     Just picked up my prescription for Omeprazole, a 90 day supply. It said \"No Refills\". I usually have refills to last a year supply.                      "

## 2021-06-19 NOTE — LETTER
Letter by Brodie Patel CNP at      Author: Brodie Patel CNP Service: -- Author Type: --    Filed:  Encounter Date: 2019 Status: (Other)                 To whom it may concern,      Nadiya De La Torre,  [birth year], has a medical need for orthotic inserts for prevention and alleviation of her low back pain.  If you have any questions, do not hesitate to reach out at [phone]            Brodie Patel CNP

## 2021-06-19 NOTE — LETTER
Letter by Brodie Patel CNP at      Author: Brodie Patel CNP Service: -- Author Type: --    Filed:  Encounter Date: 2019 Status: (Other)               To whom it may concern,      Nadiya Britt,  1965, has medical need for [employer] Dry Sheets for prevention of candidiasis infections.    If you have any questions, do not hesitate to reach out to us Monday-Friday at [phone]            Brodie Patel CNP

## 2021-06-22 NOTE — TELEPHONE ENCOUNTER
Due to be seen     Medication was last renewed on 7/14/18.    Hoda Hopkins, [facility] Triage/Med Refill 1/7/2019     Requested Prescriptions   Pending Prescriptions Disp Refills     omeprazole (PRILOSEC) 20 MG capsule [Pharmacy Med Name: Omeprazole Oral Capsule Delayed Release 20 MG] 90 capsule 0     Sig: TAKE 1 CAPSULE BY MOUTH ONCE DAILY BEFORE A MEAL    GI Medications Refill Protocol Passed - 1/6/2019  7:00 AM       Passed - PCP or prescribing provider visit in last 12 or next 3 months.    Last office visit with prescriber/PCP: 5/4/2017 Kiera Yi MD OR same dept: Visit date not found OR same specialty: 12/19/2017 Irving Bai MD  Last physical: 2/12/2018 Last MTM visit: Visit date not found   Next visit within 3 mo: Visit date not found  Next physical within 3 mo: Visit date not found  Prescriber OR PCP: Kiera Yi MD  Last diagnosis associated with med order: 1. Chronic Reflux Esophagitis  - omeprazole (PRILOSEC) 20 MG capsule [Pharmacy Med Name: Omeprazole Oral Capsule Delayed Release 20 MG]; TAKE 1 CAPSULE BY MOUTH ONCE DAILY BEFORE A MEAL  Dispense: 90 capsule; Refill: 0    If protocol passes may refill for 12 months if within 3 months of last provider visit (or a total of 15 months).

## 2021-06-23 NOTE — TELEPHONE ENCOUNTER
If patient is still having symptoms from the fistula, she needs to be seen and evaluated.   Please assist her with scheduling an appointment for evaluation. I am not comfortable repeating the antibiotic without him being seen.

## 2021-06-23 NOTE — TELEPHONE ENCOUNTER
Patient notified over phone. Verbalized results.    Nay Zazueta, [facility]

## 2021-06-23 NOTE — TELEPHONE ENCOUNTER
RN cannot approve Refill Request    RN can NOT refill this medication med is not covered by policy/route to provider.     Last office visit: 5/4/2017 Kiera Yi MD Last Physical: 2/12/2018 Last MTM visit: Visit date not found Last visit same specialty: 12/19/2017 Irving Bai MD.  Next visit within 3 mo: Visit date not found  Next physical within 3 mo: Visit date not found      Hoda Hopkins, [facility] Triage/Med Refill 1/18/2019    Requested Prescriptions   Pending Prescriptions Disp Refills     cephalexin (KEFLEX) 500 MG capsule [Pharmacy Med Name: Cephalexin Oral Capsule 500 MG] 40 capsule 0     Sig: Take 1 capsule (500 mg total) by mouth 4 times a day for 10 days.    There is no refill protocol information for this order

## 2021-06-25 NOTE — TELEPHONE ENCOUNTER
RN cannot approve Refill Request    RN can NOT refill this medication med is not covered by policy/route to provider. Last office visit: 2/22/2021 Ting oPole NP Last Physical: 4/12/2021 Last MTM visit: Visit date not found Last visit same specialty: 2/22/2021 Ting Poole NP.  Next visit within 3 mo: Visit date not found  Next physical within 3 mo: Visit date not found      Jaja Mcfarland, [facility] Triage/Med Refill 6/13/2021    Requested Prescriptions   Pending Prescriptions Disp Refills     celecoxib (CELEBREX) 200 MG capsule [Pharmacy Med Name: Celecoxib Oral Capsule 200 MG] 60 capsule 0     Sig: TAKE 1 CAPSULE BY MOUTH ONE TIME DAILY WITH FOOD       There is no refill protocol information for this order

## 2021-07-03 NOTE — ANESTHESIA PREPROCEDURE EVALUATION
Anesthesia Preprocedure Evaluation by Abel Jamison MD at 4/29/2021  7:33 AM     Author: Abel Jamison MD Service: -- Author Type: Physician    Filed: 4/29/2021  7:34 AM Date of Service: 4/29/2021  7:33 AM Status: Signed    : Abel Jamison MD (Physician)       Anesthesia Evaluation      Patient summary reviewed   No history of anesthetic complications     Airway   Mallampati: IV  Neck ROM: full   Pulmonary - negative ROS and normal exam    breath sounds clear to auscultation                         Cardiovascular - negative ROS and normal exam  Rhythm: regular  Rate: normal,         Neuro/Psych - negative ROS     Endo/Other    (+) obesity,      GI/Hepatic/Renal    (+) GERD well controlled,             Dental - normal exam                            Anesthesia Plan  Planned anesthetic: MAC  MAC    Please give pt versed as going back to the OR given anxiety   ASA 2     Anesthetic plan and risks discussed with: patient  Anesthesia plan special considerations: antiemetics,   Post-op plan: routine recovery

## 2021-07-21 ENCOUNTER — RECORDS - HEALTHEAST (OUTPATIENT)
Dept: ADMINISTRATIVE | Facility: CLINIC | Age: 56
End: 2021-07-21

## 2021-07-22 ENCOUNTER — RECORDS - HEALTHEAST (OUTPATIENT)
Dept: LAB | Facility: CLINIC | Age: 56
End: 2021-07-22

## 2021-07-22 DIAGNOSIS — Z00.00 ROUTINE GENERAL MEDICAL EXAMINATION AT A HEALTH CARE FACILITY: ICD-10-CM

## 2021-10-11 ENCOUNTER — HEALTH MAINTENANCE LETTER (OUTPATIENT)
Age: 56
End: 2021-10-11

## 2021-11-22 ENCOUNTER — E-VISIT (OUTPATIENT)
Dept: FAMILY MEDICINE | Facility: CLINIC | Age: 56
End: 2021-11-22
Payer: COMMERCIAL

## 2021-11-22 DIAGNOSIS — N89.8 VAGINAL DISCHARGE: Primary | ICD-10-CM

## 2021-11-22 PROCEDURE — 99421 OL DIG E/M SVC 5-10 MIN: CPT | Performed by: NURSE PRACTITIONER

## 2021-11-22 RX ORDER — FLUCONAZOLE 150 MG/1
150 TABLET ORAL DAILY
Qty: 3 TABLET | Refills: 0 | Status: SHIPPED | OUTPATIENT
Start: 2021-11-22 | End: 2021-11-25

## 2021-11-22 NOTE — TELEPHONE ENCOUNTER
Provider E-Visit time total (minutes): less than 10 minutes.    I have gone ahead and sent you a total of 3 tablets of the Diflucan. Take daily x3 doses.

## 2021-11-22 NOTE — PATIENT INSTRUCTIONS
Thank you for choosing us for your care. I have placed an order for a prescription so that you can start treatment. View your full visit summary for details by clicking on the link below. Your pharmacist will able to address any questions you may have about the medication.     If you re not feeling better within 2-3 days, please schedule an appointment.  You can schedule an appointment right here in [facility], or call [phone]  If the visit is for the same symptoms as your eVisit, we ll refund the cost of your eVisit if seen within seven days.

## 2022-03-27 ENCOUNTER — HEALTH MAINTENANCE LETTER (OUTPATIENT)
Age: 57
End: 2022-03-27

## 2022-04-26 DIAGNOSIS — K21.00 GASTROESOPHAGEAL REFLUX DISEASE WITH ESOPHAGITIS WITHOUT HEMORRHAGE: Primary | ICD-10-CM

## 2022-06-07 ENCOUNTER — OFFICE VISIT (OUTPATIENT)
Dept: FAMILY MEDICINE | Facility: CLINIC | Age: 57
End: 2022-06-07
Payer: COMMERCIAL

## 2022-06-07 VITALS
SYSTOLIC BLOOD PRESSURE: 126 MMHG | OXYGEN SATURATION: 99 % | TEMPERATURE: 98 F | HEART RATE: 106 BPM | RESPIRATION RATE: 16 BRPM | DIASTOLIC BLOOD PRESSURE: 84 MMHG | BODY MASS INDEX: 38.98 KG/M2 | HEIGHT: 63 IN | WEIGHT: 220 LBS

## 2022-06-07 DIAGNOSIS — K21.00 GASTROESOPHAGEAL REFLUX DISEASE WITH ESOPHAGITIS WITHOUT HEMORRHAGE: ICD-10-CM

## 2022-06-07 DIAGNOSIS — Z71.89 ACP (ADVANCE CARE PLANNING): ICD-10-CM

## 2022-06-07 DIAGNOSIS — Z12.31 VISIT FOR SCREENING MAMMOGRAM: ICD-10-CM

## 2022-06-07 DIAGNOSIS — N82.3 RECTO-VAGINAL FISTULA: ICD-10-CM

## 2022-06-07 DIAGNOSIS — R05.9 COUGH: ICD-10-CM

## 2022-06-07 DIAGNOSIS — Z11.4 SCREENING FOR HIV (HUMAN IMMUNODEFICIENCY VIRUS): ICD-10-CM

## 2022-06-07 DIAGNOSIS — Z13.220 SCREENING FOR HYPERLIPIDEMIA: ICD-10-CM

## 2022-06-07 DIAGNOSIS — Z00.00 ANNUAL PHYSICAL EXAM: Primary | ICD-10-CM

## 2022-06-07 DIAGNOSIS — Z11.59 NEED FOR HEPATITIS C SCREENING TEST: ICD-10-CM

## 2022-06-07 LAB
ANION GAP SERPL CALCULATED.3IONS-SCNC: 13 MMOL/L (ref 5–18)
BUN SERPL-MCNC: 12 MG/DL (ref 8–22)
CALCIUM SERPL-MCNC: 9.6 MG/DL (ref 8.5–10.5)
CHLORIDE BLD-SCNC: 100 MMOL/L (ref 98–107)
CHOLEST SERPL-MCNC: 203 MG/DL
CO2 SERPL-SCNC: 25 MMOL/L (ref 22–31)
CREAT SERPL-MCNC: 0.75 MG/DL (ref 0.6–1.1)
FASTING STATUS PATIENT QL REPORTED: ABNORMAL
GFR SERPL CREATININE-BSD FRML MDRD: >90 ML/MIN/1.73M2
GLUCOSE BLD-MCNC: 121 MG/DL (ref 70–125)
HDLC SERPL-MCNC: 44 MG/DL
HGB BLD-MCNC: 13 G/DL (ref 11.7–15.7)
HIV 1+2 AB+HIV1 P24 AG SERPL QL IA: NEGATIVE
LDLC SERPL CALC-MCNC: 140 MG/DL
POTASSIUM BLD-SCNC: 4.3 MMOL/L (ref 3.5–5)
SODIUM SERPL-SCNC: 138 MMOL/L (ref 136–145)
TRIGL SERPL-MCNC: 95 MG/DL

## 2022-06-07 PROCEDURE — 87389 HIV-1 AG W/HIV-1&-2 AB AG IA: CPT | Performed by: NURSE PRACTITIONER

## 2022-06-07 PROCEDURE — 85018 HEMOGLOBIN: CPT | Performed by: NURSE PRACTITIONER

## 2022-06-07 PROCEDURE — 99213 OFFICE O/P EST LOW 20 MIN: CPT | Mod: 25 | Performed by: NURSE PRACTITIONER

## 2022-06-07 PROCEDURE — 80061 LIPID PANEL: CPT | Performed by: NURSE PRACTITIONER

## 2022-06-07 PROCEDURE — 82306 VITAMIN D 25 HYDROXY: CPT | Performed by: NURSE PRACTITIONER

## 2022-06-07 PROCEDURE — 80048 BASIC METABOLIC PNL TOTAL CA: CPT | Performed by: NURSE PRACTITIONER

## 2022-06-07 PROCEDURE — 86803 HEPATITIS C AB TEST: CPT | Performed by: NURSE PRACTITIONER

## 2022-06-07 PROCEDURE — 99396 PREV VISIT EST AGE 40-64: CPT | Performed by: NURSE PRACTITIONER

## 2022-06-07 PROCEDURE — 36415 COLL VENOUS BLD VENIPUNCTURE: CPT | Performed by: NURSE PRACTITIONER

## 2022-06-07 ASSESSMENT — ENCOUNTER SYMPTOMS
HEMATURIA: 0
ABDOMINAL PAIN: 0
HEMATOCHEZIA: 0
CHILLS: 0

## 2022-06-07 ASSESSMENT — PAIN SCALES - GENERAL: PAINLEVEL: NO PAIN (0)
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
## 2022-06-08 ENCOUNTER — MYC MEDICAL ADVICE (OUTPATIENT)
Dept: FAMILY MEDICINE | Facility: CLINIC | Age: 57
End: 2022-06-08
Payer: COMMERCIAL

## 2022-06-08 DIAGNOSIS — Z11.59 ENCOUNTER FOR HEPATITIS C SCREENING TEST FOR LOW RISK PATIENT: Primary | ICD-10-CM

## 2022-06-08 LAB
DEPRECATED CALCIDIOL+CALCIFEROL SERPL-MC: 56 UG/L (ref 20–75)
HCV AB SERPL QL IA: ABNORMAL

## 2022-06-08 NOTE — TELEPHONE ENCOUNTER
Will call clinic that she is having her shingles vaccine to get the lab scheduled at the same time.

## 2022-06-10 ENCOUNTER — ANCILLARY PROCEDURE (OUTPATIENT)
Dept: MAMMOGRAPHY | Facility: CLINIC | Age: 57
End: 2022-06-10
Attending: NURSE PRACTITIONER
Payer: COMMERCIAL

## 2022-06-10 DIAGNOSIS — Z12.31 VISIT FOR SCREENING MAMMOGRAM: ICD-10-CM

## 2022-06-10 PROCEDURE — 77067 SCR MAMMO BI INCL CAD: CPT | Mod: TC | Performed by: RADIOLOGY

## 2022-06-10 PROCEDURE — 77063 BREAST TOMOSYNTHESIS BI: CPT | Mod: TC | Performed by: RADIOLOGY

## 2022-06-15 ENCOUNTER — ALLIED HEALTH/NURSE VISIT (OUTPATIENT)
Dept: FAMILY MEDICINE | Facility: CLINIC | Age: 57
End: 2022-06-15
Payer: COMMERCIAL

## 2022-06-15 DIAGNOSIS — Z23 ENCOUNTER FOR IMMUNIZATION: Primary | ICD-10-CM

## 2022-06-15 PROCEDURE — 90471 IMMUNIZATION ADMIN: CPT

## 2022-06-15 PROCEDURE — 90750 HZV VACC RECOMBINANT IM: CPT

## 2022-06-15 PROCEDURE — 99207 PR NO CHARGE NURSE ONLY: CPT

## 2022-09-14 ENCOUNTER — ALLIED HEALTH/NURSE VISIT (OUTPATIENT)
Dept: FAMILY MEDICINE | Facility: CLINIC | Age: 57
End: 2022-09-14
Payer: COMMERCIAL

## 2022-09-14 DIAGNOSIS — Z23 NEED FOR VACCINATION: Primary | ICD-10-CM

## 2022-09-14 PROCEDURE — 90750 HZV VACC RECOMBINANT IM: CPT

## 2022-09-14 PROCEDURE — 99207 PR NO CHARGE NURSE ONLY: CPT

## 2022-09-14 PROCEDURE — 90471 IMMUNIZATION ADMIN: CPT

## 2022-09-14 NOTE — PROGRESS NOTES
Prior to immunization administration, verified patients identity using patient s name and date of birth. Please see Immunization Activity for additional information.     Screening Questionnaire for Adult Immunization    Are you sick today?   No   Do you have allergies to medications, food, a vaccine component or latex?   No   Have you ever had a serious reaction after receiving a vaccination?   No   Do you have a long-term health problem with heart, lung, kidney, or metabolic disease (e.g., diabetes), asthma, a blood disorder, no spleen, complement component deficiency, a cochlear implant, or a spinal fluid leak?  Are you on long-term aspirin therapy?   No   Do you have cancer, leukemia, HIV/AIDS, or any other immune system problem?   No   Do you have a parent, brother, or sister with an immune system problem?   No   In the past 3 months, have you taken medications that affect  your immune system, such as prednisone, other steroids, or anticancer drugs; drugs for the treatment of rheumatoid arthritis, Crohn s disease, or psoriasis; or have you had radiation treatments?   No   Have you had a seizure, or a brain or other nervous system problem?   No   During the past year, have you received a transfusion of blood or blood    products, or been given immune (gamma) globulin or antiviral drug?   No   For women: Are you pregnant or is there a chance you could become       pregnant during the next month?   No   Have you received any vaccinations in the past 4 weeks?   No     Immunization questionnaire answers were all negative.        Per orders of Dr. Velarde , injection of Shingrix given by Samaria Mejia. Patient instructed to remain in clinic for 15 minutes afterwards, and to report any adverse reaction to me immediately.       Screening performed by Samaria Mejai on 9/14/2022 at 9:55 AM.

## 2022-09-24 ENCOUNTER — HEALTH MAINTENANCE LETTER (OUTPATIENT)
Age: 57
End: 2022-09-24

## 2023-06-12 ENCOUNTER — OFFICE VISIT (OUTPATIENT)
Dept: FAMILY MEDICINE | Facility: CLINIC | Age: 58
End: 2023-06-12
Payer: COMMERCIAL

## 2023-06-12 VITALS
TEMPERATURE: 98.4 F | BODY MASS INDEX: 41.26 KG/M2 | SYSTOLIC BLOOD PRESSURE: 112 MMHG | HEIGHT: 62 IN | RESPIRATION RATE: 16 BRPM | OXYGEN SATURATION: 100 % | WEIGHT: 224.2 LBS | HEART RATE: 94 BPM | DIASTOLIC BLOOD PRESSURE: 82 MMHG

## 2023-06-12 DIAGNOSIS — N95.0 POSTMENOPAUSAL BLEEDING: ICD-10-CM

## 2023-06-12 DIAGNOSIS — E66.01 MORBID OBESITY (H): ICD-10-CM

## 2023-06-12 DIAGNOSIS — K21.00 GASTROESOPHAGEAL REFLUX DISEASE WITH ESOPHAGITIS WITHOUT HEMORRHAGE: ICD-10-CM

## 2023-06-12 DIAGNOSIS — Z01.818 PREOP GENERAL PHYSICAL EXAM: Primary | ICD-10-CM

## 2023-06-12 DIAGNOSIS — R76.8 POSITIVE HEPATITIS C ANTIBODY TEST: ICD-10-CM

## 2023-06-12 LAB
ANION GAP SERPL CALCULATED.3IONS-SCNC: 14 MMOL/L (ref 7–15)
BUN SERPL-MCNC: 10 MG/DL (ref 8–23)
CALCIUM SERPL-MCNC: 9.6 MG/DL (ref 8.6–10)
CHLORIDE SERPL-SCNC: 101 MMOL/L (ref 98–107)
CREAT SERPL-MCNC: 0.71 MG/DL (ref 0.51–0.95)
DEPRECATED HCO3 PLAS-SCNC: 24 MMOL/L (ref 22–29)
ERYTHROCYTE [DISTWIDTH] IN BLOOD BY AUTOMATED COUNT: 13.3 % (ref 10–15)
GFR SERPL CREATININE-BSD FRML MDRD: >90 ML/MIN/1.73M2
GLUCOSE SERPL-MCNC: 115 MG/DL (ref 70–99)
HCT VFR BLD AUTO: 40.2 % (ref 35–47)
HGB BLD-MCNC: 13.8 G/DL (ref 11.7–15.7)
MCH RBC QN AUTO: 29.4 PG (ref 26.5–33)
MCHC RBC AUTO-ENTMCNC: 34.3 G/DL (ref 31.5–36.5)
MCV RBC AUTO: 86 FL (ref 78–100)
PLATELET # BLD AUTO: 292 10E3/UL (ref 150–450)
POTASSIUM SERPL-SCNC: 4.1 MMOL/L (ref 3.4–5.3)
RBC # BLD AUTO: 4.69 10E6/UL (ref 3.8–5.2)
SODIUM SERPL-SCNC: 139 MMOL/L (ref 136–145)
WBC # BLD AUTO: 10.3 10E3/UL (ref 4–11)

## 2023-06-12 PROCEDURE — 99213 OFFICE O/P EST LOW 20 MIN: CPT | Performed by: PHYSICIAN ASSISTANT

## 2023-06-12 PROCEDURE — 36415 COLL VENOUS BLD VENIPUNCTURE: CPT | Performed by: PHYSICIAN ASSISTANT

## 2023-06-12 PROCEDURE — 86803 HEPATITIS C AB TEST: CPT | Performed by: PHYSICIAN ASSISTANT

## 2023-06-12 PROCEDURE — 80048 BASIC METABOLIC PNL TOTAL CA: CPT | Performed by: PHYSICIAN ASSISTANT

## 2023-06-12 PROCEDURE — 85027 COMPLETE CBC AUTOMATED: CPT | Performed by: PHYSICIAN ASSISTANT

## 2023-06-12 ASSESSMENT — PAIN SCALES - GENERAL: PAINLEVEL: NO PAIN (0)

## 2023-06-12 NOTE — NURSING NOTE
"Chief Complaint   Patient presents with     Pre-Op Exam     Initial /82 (BP Location: Right arm, Patient Position: Sitting, Cuff Size: Adult Large)   Pulse 94   Temp 98.4  F (36.9  C) (Oral)   Resp 16   Ht 1.581 m (5' 2.25\")   Wt 101.7 kg (224 lb 3.2 oz)   SpO2 100%   BMI 40.68 kg/m   Estimated body mass index is 40.68 kg/m  as calculated from the following:    Height as of this encounter: 1.581 m (5' 2.25\").    Weight as of this encounter: 101.7 kg (224 lb 3.2 oz).  BP completed using cuff size large right arm    Lisa Magill, CMA    "

## 2023-06-12 NOTE — PATIENT INSTRUCTIONS
For informational purposes only. Not to replace the advice of your health care provider. Copyright   2003,  [facility]. All rights reserved. Clinically reviewed by Valeria Abad MD. [employer] [id number] - REV .  Preparing for Your Surgery  Getting started  A nurse will call you to review your health history and instructions. They will give you an arrival time based on your scheduled surgery time. Please be ready to share:  Your doctor's clinic name and phone number  Your medical, surgical, and anesthesia history  A list of allergies and sensitivities  A list of medicines, including herbal treatments and over-the-counter drugs  Whether the patient has a legal guardian (ask how to send us the papers in advance)  Please tell us if you're pregnant--or if there's any chance you might be pregnant. Some surgeries may injure a fetus (unborn baby), so they require a pregnancy test. Surgeries that are safe for a fetus don't always need a test, and you can choose whether to have one.   If you have a child who's having surgery, please ask for a copy of Preparing for Your Child's Surgery.    Preparing for surgery  Within 10 to 30 days of surgery: Have a pre-op exam (sometimes called an H&P, or History and Physical). This can be done at a clinic or pre-operative center.  If you're having a , you may not need this exam. Talk to your care team.  At your pre-op exam, talk to your care team about all medicines you take. If you need to stop any medicines before surgery, ask when to start taking them again.  We do this for your safety. Many medicines can make you bleed too much during surgery. Some change how well surgery (anesthesia) drugs work.  Call your insurance company to let them know you're having surgery. (If you don't have insurance, call [phone].)  Call your clinic if there's any change in your health. This includes signs of a cold or flu (sore throat, runny nose, cough, rash, fever). It  also includes a scrape or scratch near the surgery site.  If you have questions on the day of surgery, call your hospital or surgery center.  Eating and drinking guidelines  For your safety: Unless your surgeon tells you otherwise, follow the guidelines below.  Eat and drink as usual until 8 hours before you arrive for surgery. After that, no food or milk.  Drink clear liquids until 2 hours before you arrive. These are liquids you can see through, like water, Gatorade, and Propel Water. They also include plain black coffee and tea (no cream or milk), candy, and breath mints. You can spit out gum when you arrive.  If you drink alcohol: Stop drinking it the night before surgery.  If your care team tells you to take medicine on the morning of surgery, it's okay to take it with a sip of water.  Preventing infection  Shower or bathe the night before and morning of your surgery. Follow the instructions your clinic gave you. (If no instructions, use regular soap.)  Don't shave or clip hair near your surgery site. We'll remove the hair if needed.  Don't smoke or vape the morning of surgery. You may chew nicotine gum up to 2 hours before surgery. A nicotine patch is okay.  Note: Some surgeries require you to completely quit smoking and nicotine. Check with your surgeon.  Your care team will make every effort to keep you safe from infection. We will:  Clean our hands often with soap and water (or an alcohol-based hand rub).  Clean the skin at your surgery site with a special soap that kills germs.  Give you a special gown to keep you warm. (Cold raises the risk of infection.)  Wear special hair covers, masks, gowns and gloves during surgery.  Give antibiotic medicine, if prescribed. Not all surgeries need antibiotics.  What to bring on the day of surgery  Photo ID and insurance card  Copy of your health care directive, if you have one  Glasses and hearing aids (bring cases)  You can't wear contacts during surgery  Inhaler and  eye drops, if you use them (tell us about these when you arrive)  CPAP machine or breathing device, if you use them  A few personal items, if spending the night  If you have . . .  A pacemaker, ICD (cardiac defibrillator) or other implant: Bring the ID card.  An implanted stimulator: Bring the remote control.  A legal guardian: Bring a copy of the certified (court-stamped) guardianship papers.  Please remove any jewelry, including body piercings. Leave jewelry and other valuables at home.  If you're going home the day of surgery  You must have a responsible adult drive you home. They should stay with you overnight as well.  If you don't have someone to stay with you, and you aren't safe to go home alone, we may keep you overnight. Insurance often won't pay for this.  After surgery  If it's hard to control your pain or you need more pain medicine, please call your surgeon's office.  Questions?   If you have any questions for your care team, list them here: _________________________________________________________________________________________________________________________________________________________________________ ____________________________________ ____________________________________ ____________________________________    How to Take Your Medication Before Surgery  - Take all of your medications before surgery as usual

## 2023-06-12 NOTE — PROGRESS NOTES
[facility]  [address] [facility] [address]  Phone: [phone]  Primary Provider: Leela Ramos  Pre-op Performing Provider: KAYODE WALSH      PREOPERATIVE EVALUATION:  Today's date: 6/12/2023    Nadiya De La Torre is a 57 year old female who presents for a preoperative evaluation.      6/12/2023     2:00 PM   Additional Questions   Roomed by Lisa Magill, CMA   Accompanied by self     Forms 6/12/2023   Any forms needing to be completed Yes         6/12/2023     2:00 PM   Patient Reported Additional Medications   Patient reports taking the following new medications NONE     Surgical Information:  Surgery/Procedure: hysteroscopy   Surgery Location: [facility]   Surgeon: Lorna Duff  Surgery Date: 06/15/23  Time of Surgery: 09:30am   Where patient plans to recover: At home with family  Fax number for surgical facility: [phone]    Assessment & Plan     The proposed surgical procedure is considered INTERMEDIATE risk.    Preop general physical exam  Ok for surgery- labs pending.  Patient on no chronic meds other than prilosec.  - Basic metabolic panel  (Ca, Cl, CO2, Creat, Gluc, K, Na, BUN); Future  - CBC with platelets; Future  - Basic metabolic panel  (Ca, Cl, CO2, Creat, Gluc, K, Na, BUN)  - CBC with platelets    Postmenopausal bleeding  Following with OBGYN    Obesity (BMI 35.0-39.9) with comorbidity (H)  Weight loss recommended so as not to worsen GERD and to avoid developing other complications.  Has PE coming up, can discuss more if needed.    Gastroesophageal reflux disease with esophagitis without hemorrhage  Taking OTC prilosec    Positive hepatitis C antibody test  Past hx of equivocal test- has not had it repeated.  Will do this today with blood draw.  - Hepatitis C Screen Reflex to HCV RNA Quant and Genotype; Future  - Hepatitis C Screen Reflex to HCV RNA Quant and Genotype            - No identified additional risk factors other than  previously addressed    Antiplatelet or Anticoagulation Medication Instructions:   - Patient is on no antiplatelet or anticoagulation medications.    Additional Medication Instructions:  Patient is to take all scheduled medications on the day of surgery    RECOMMENDATION:  APPROVAL GIVEN to proceed with proposed procedure, without further diagnostic evaluation.    }    Subjective       HPI related to upcoming procedure: patient here for pre op exam prior to having a hysteroscopy done for post menopausal bleeding and thickened endometrium.        6/9/2023    10:36 PM   Preop Questions   1. Have you ever had a heart attack or stroke? No   2. Have you ever had surgery on your heart or blood vessels, such as a stent placement, a coronary artery bypass, or surgery on an artery in your head, neck, heart, or legs? No   3. Do you have chest pain with activity? No   4. Do you have a history of  heart failure? No   5. Do you currently have a cold, bronchitis or symptoms of other infection? No   6. Do you have a cough, shortness of breath, or wheezing? No   7. Do you or anyone in your family have previous history of blood clots? No   8. Do you or does anyone in your family have a serious bleeding problem such as prolonged bleeding following surgeries or cuts? No   9. Have you ever had problems with anemia or been told to take iron pills? No   10. Have you had any abnormal blood loss such as black, tarry or bloody stools, or abnormal vaginal bleeding? YES - vaginal spotting   11. Have you ever had a blood transfusion? No   12. Are you willing to have a blood transfusion if it is medically needed before, during, or after your surgery? NO    13. Have you or any of your relatives ever had problems with anesthesia? YES - father (hard to wake)   14. Do you have sleep apnea, excessive snoring or daytime drowsiness? No   15. Do you have any artifical heart valves or other implanted medical devices like a pacemaker, defibrillator, or  continuous glucose monitor? No   16. Do you have artificial joints? No   17. Are you allergic to latex? No   18. Is there any chance that you may be pregnant? No       Health Care Directive:  Patient does not have a Health Care Directive or Living Will: Patient states has Advance Directive and will bring in a copy to clinic.    Preoperative Review of :   reviewed - no record of controlled substances prescribed.      Status of Chronic Conditions:  See problem list for active medical problems.  Problems all longstanding and stable, except as noted/documented.  See ROS for pertinent symptoms related to these conditions.      Review of Systems  Constitutional, neuro, ENT, endocrine, pulmonary, cardiac, gastrointestinal, genitourinary, musculoskeletal, integument and psychiatric systems are negative, except as otherwise noted.    Patient Active Problem List    Diagnosis Date Noted     Cough 06/07/2022     Priority: Medium     Endometrial thickening on ultrasound 04/12/2021     Priority: Medium     Obesity (BMI 35.0-39.9) with comorbidity (H) 02/22/2021     Priority: Medium     Vaginal spotting 02/22/2021     Priority: Medium     Acute pain of left shoulder 02/22/2021     Priority: Medium     Recto-vaginal fistula 04/07/2017     Priority: Medium     Other screening mammogram 04/06/2017     Priority: Medium     Postmenopausal bleeding 06/12/2015     Priority: Medium     Obesity      Priority: Medium     Created by [facility] Annotation: Jan 16 2010 11:35Kiera Shannon: BMI 37.1    1/13/2010Weight 212 12/2008         Prediabetes      Priority: Medium     Created by [facility] Annotation: Jan 16 2010 11:08Kiera Shannon: Went to   classes         Chronic Reflux Esophagitis      Priority: Medium     Created by Conversion         Vaginal Pain      Priority: Medium     Created by Conversion         Perirectal Abscess      Priority: Medium     Created by [facility] Annotation:  Jan 16 2010 11:07NEDA - Kiera Yi: This is a   perirectal fistula that drains in Right labia          Past Medical History:   Diagnosis Date     Abscess of Bartholin's gland      Arthritis 10 years ?    osteoarthritis in knee     Fibrocystic breast      Past Surgical History:   Procedure Laterality Date     BIOPSY  April 2021    uterus     DE DRAIN PILONIDAL CYST SIMPL       Incision And Drainage Of Pilonidal Cyst, Simple;  Recorded: 01/16/2010;     DE HYSTEROSCOPY,W/ENDO BX N/A 04/29/2021    Procedure: EXAM UNDER ANESTHESIA, PAPANCOLAOU SMEAR, HYSTEROSCOPY, WITH DILATION AND CURETTAGE;  Surgeon: Aleida Duff MD;  Location: [facility];  Service: Gynecology     DE I&D BARTHOLIN GLAND ABSCESS  12/01/2001     REMV PILONIDAL LESION SIMPLE      Pilonidal Cyst Resection;  Recorded: 01/16/2010;     WISDOM TOOTH EXTRACTION       Current Outpatient Medications   Medication Sig Dispense Refill     calcium carbonate/vitamin D3 (CALCIUM 600 + D,3, ORAL) [CALCIUM CARBONATE/VITAMIN D3 (CALCIUM 600 + D,3, ORAL)] Take by mouth.       mv-mn/folic ac/calcium/vit K1 (WOMEN'S 50 PLUS MULTIVITAMIN ORAL) [MV-MN/FOLIC AC/CALCIUM/VIT K1 (WOMEN'S 50 PLUS MULTIVITAMIN ORAL)] Take by mouth.       omeprazole (PRILOSEC) 20 MG DR capsule [OMEPRAZOLE (PRILOSEC) 20 MG CAPSULE] TAKE 1 CAPSULE BY MOUTH ONE TIME DAILY BEFORE A MEAL 90 capsule 3     aspirin 81 MG EC tablet [ASPIRIN 81 MG EC TABLET] Take 1 tablet (81 mg total) by mouth daily. (Patient not taking: Reported on 6/12/2023)  0     ibuprofen (ADVIL,MOTRIN) 200 MG tablet [IBUPROFEN (ADVIL,MOTRIN) 200 MG TABLET] Take 200 mg by mouth every 6 (six) hours as needed for pain.         Allergies   Allergen Reactions     Soap      [place]     Sulfa (Sulfonamide Antibiotics) [Sulfa Antibiotics] Unknown        Social History     Tobacco Use     Smoking status: Never     Smokeless tobacco: Never   Vaping Use     Vaping status: Never Used   Substance Use Topics     Alcohol use: Yes      "Comment: occasional       History   Drug Use No         Objective     /82 (BP Location: Right arm, Patient Position: Sitting, Cuff Size: Adult Large)   Pulse 94   Temp 98.4  F (36.9  C) (Oral)   Resp 16   Ht 1.581 m (5' 2.25\")   Wt 101.7 kg (224 lb 3.2 oz)   SpO2 100%   BMI 40.68 kg/m      Physical Exam    GENERAL APPEARANCE: healthy, alert and no distress     EYES: EOMI, PERRL     HENT: ear canals and TM's normal and nose and mouth without ulcers or lesions     NECK: no adenopathy, no asymmetry, masses, or scars and thyroid normal to palpation     RESP: lungs clear to auscultation - no rales, rhonchi or wheezes     CV: regular rates and rhythm, normal S1 S2, no S3 or S4 and no murmur, click or rub     ABDOMEN:  soft, nontender, no HSM or masses and bowel sounds normal     MS: extremities normal- no gross deformities noted, no evidence of inflammation in joints, FROM in all extremities.     SKIN: no suspicious lesions or rashes     NEURO: Normal strength and tone, sensory exam grossly normal, mentation intact and speech normal     PSYCH: mentation appears normal. and affect normal/bright     LYMPHATICS: No cervical adenopathy    Recent Labs   Lab Test 06/07/22  1209   HGB 13.0      POTASSIUM 4.3   CR 0.75        Diagnostics:  Labs pending at this time.  Results will be reviewed when available.   No EKG required, no history of coronary heart disease, significant arrhythmia, peripheral arterial disease or other structural heart disease.    Revised Cardiac Risk Index (RCRI):  The patient has the following serious cardiovascular risks for perioperative complications:   - No serious cardiac risks = 0 points     RCRI Interpretation: 0 points: Class I (very low risk - 0.4% complication rate)           Signed Electronically by: Silas Castillo PA-C  Copy of this evaluation report is provided to requesting physician.      "

## 2023-06-13 LAB — HCV AB SERPL QL IA: NONREACTIVE

## 2023-06-14 ENCOUNTER — ANESTHESIA EVENT (OUTPATIENT)
Dept: SURGERY | Facility: AMBULATORY SURGERY CENTER | Age: 58
End: 2023-06-14
Payer: COMMERCIAL

## 2023-06-15 ENCOUNTER — ANESTHESIA (OUTPATIENT)
Dept: SURGERY | Facility: AMBULATORY SURGERY CENTER | Age: 58
End: 2023-06-15
Payer: COMMERCIAL

## 2023-06-15 ENCOUNTER — HOSPITAL ENCOUNTER (OUTPATIENT)
Facility: AMBULATORY SURGERY CENTER | Age: 58
Discharge: HOME | End: 2023-06-15
Attending: STUDENT IN AN ORGANIZED HEALTH CARE EDUCATION/TRAINING PROGRAM
Payer: COMMERCIAL

## 2023-06-15 VITALS
DIASTOLIC BLOOD PRESSURE: 72 MMHG | SYSTOLIC BLOOD PRESSURE: 134 MMHG | OXYGEN SATURATION: 99 % | TEMPERATURE: 97 F | BODY MASS INDEX: 41.22 KG/M2 | RESPIRATION RATE: 16 BRPM | WEIGHT: 224 LBS | HEIGHT: 62 IN | HEART RATE: 78 BPM

## 2023-06-15 DIAGNOSIS — Z98.890 STATUS POST HYSTEROSCOPY: Primary | ICD-10-CM

## 2023-06-15 DIAGNOSIS — N95.0 POSTMENOPAUSAL VAGINAL BLEEDING: ICD-10-CM

## 2023-06-15 PROCEDURE — 87624 HPV HI-RISK TYP POOLED RSLT: CPT | Performed by: STUDENT IN AN ORGANIZED HEALTH CARE EDUCATION/TRAINING PROGRAM

## 2023-06-15 PROCEDURE — G0145 SCR C/V CYTO,THINLAYER,RESCR: HCPCS | Performed by: STUDENT IN AN ORGANIZED HEALTH CARE EDUCATION/TRAINING PROGRAM

## 2023-06-15 RX ORDER — OXYCODONE HYDROCHLORIDE 5 MG/1
5 TABLET ORAL
Status: CANCELLED | OUTPATIENT
Start: 2023-06-15

## 2023-06-15 RX ORDER — ONDANSETRON 4 MG/1
4 TABLET, ORALLY DISINTEGRATING ORAL EVERY 30 MIN PRN
Status: DISCONTINUED | OUTPATIENT
Start: 2023-06-15 | End: 2023-06-16 | Stop reason: HOSPADM

## 2023-06-15 RX ORDER — OXYCODONE HYDROCHLORIDE 5 MG/1
5 TABLET ORAL
Status: COMPLETED | OUTPATIENT
Start: 2023-06-15 | End: 2023-06-15

## 2023-06-15 RX ORDER — SODIUM CHLORIDE, SODIUM LACTATE, POTASSIUM CHLORIDE, CALCIUM CHLORIDE 600; 310; 30; 20 MG/100ML; MG/100ML; MG/100ML; MG/100ML
INJECTION, SOLUTION INTRAVENOUS CONTINUOUS
Status: DISCONTINUED | OUTPATIENT
Start: 2023-06-15 | End: 2023-06-16 | Stop reason: HOSPADM

## 2023-06-15 RX ORDER — IBUPROFEN 800 MG/1
800 TABLET, FILM COATED ORAL EVERY 6 HOURS PRN
Qty: 30 TABLET | Refills: 0 | COMMUNITY
Start: 2023-06-15 | End: 2023-07-10

## 2023-06-15 RX ORDER — FENTANYL CITRATE 50 UG/ML
INJECTION, SOLUTION INTRAMUSCULAR; INTRAVENOUS PRN
Status: DISCONTINUED | OUTPATIENT
Start: 2023-06-15 | End: 2023-06-15

## 2023-06-15 RX ORDER — IBUPROFEN 800 MG/1
800 TABLET, FILM COATED ORAL ONCE
Status: CANCELLED | OUTPATIENT
Start: 2023-06-15 | End: 2023-06-15

## 2023-06-15 RX ORDER — ACETAMINOPHEN 325 MG/1
975 TABLET ORAL ONCE
Status: DISCONTINUED | OUTPATIENT
Start: 2023-06-15 | End: 2023-06-16 | Stop reason: HOSPADM

## 2023-06-15 RX ORDER — HYDROMORPHONE HCL IN WATER/PF 6 MG/30 ML
0.4 PATIENT CONTROLLED ANALGESIA SYRINGE INTRAVENOUS EVERY 5 MIN PRN
Status: DISCONTINUED | OUTPATIENT
Start: 2023-06-15 | End: 2023-06-16 | Stop reason: HOSPADM

## 2023-06-15 RX ORDER — PROPOFOL 10 MG/ML
INJECTION, EMULSION INTRAVENOUS CONTINUOUS PRN
Status: DISCONTINUED | OUTPATIENT
Start: 2023-06-15 | End: 2023-06-15

## 2023-06-15 RX ORDER — FENTANYL CITRATE 0.05 MG/ML
25 INJECTION, SOLUTION INTRAMUSCULAR; INTRAVENOUS
Status: DISCONTINUED | OUTPATIENT
Start: 2023-06-15 | End: 2023-06-16 | Stop reason: HOSPADM

## 2023-06-15 RX ORDER — HYDROMORPHONE HCL IN WATER/PF 6 MG/30 ML
0.2 PATIENT CONTROLLED ANALGESIA SYRINGE INTRAVENOUS EVERY 5 MIN PRN
Status: DISCONTINUED | OUTPATIENT
Start: 2023-06-15 | End: 2023-06-16 | Stop reason: HOSPADM

## 2023-06-15 RX ORDER — LIDOCAINE HYDROCHLORIDE 10 MG/ML
INJECTION, SOLUTION EPIDURAL; INFILTRATION; INTRACAUDAL; PERINEURAL PRN
Status: DISCONTINUED | OUTPATIENT
Start: 2023-06-15 | End: 2023-06-15 | Stop reason: HOSPADM

## 2023-06-15 RX ORDER — PROPOFOL 10 MG/ML
INJECTION, EMULSION INTRAVENOUS PRN
Status: DISCONTINUED | OUTPATIENT
Start: 2023-06-15 | End: 2023-06-15

## 2023-06-15 RX ORDER — FENTANYL CITRATE 0.05 MG/ML
25 INJECTION, SOLUTION INTRAMUSCULAR; INTRAVENOUS EVERY 5 MIN PRN
Status: DISCONTINUED | OUTPATIENT
Start: 2023-06-15 | End: 2023-06-16 | Stop reason: HOSPADM

## 2023-06-15 RX ORDER — LIDOCAINE HYDROCHLORIDE 20 MG/ML
INJECTION, SOLUTION INFILTRATION; PERINEURAL PRN
Status: DISCONTINUED | OUTPATIENT
Start: 2023-06-15 | End: 2023-06-15

## 2023-06-15 RX ORDER — ACETAMINOPHEN 325 MG/1
975 TABLET ORAL EVERY 6 HOURS PRN
Qty: 50 TABLET | Refills: 0 | COMMUNITY
Start: 2023-06-15 | End: 2023-07-10

## 2023-06-15 RX ORDER — LIDOCAINE 40 MG/G
CREAM TOPICAL
Status: DISCONTINUED | OUTPATIENT
Start: 2023-06-15 | End: 2023-06-16 | Stop reason: HOSPADM

## 2023-06-15 RX ORDER — MAGNESIUM SULFATE HEPTAHYDRATE 40 MG/ML
2 INJECTION, SOLUTION INTRAVENOUS ONCE
Status: COMPLETED | OUTPATIENT
Start: 2023-06-15 | End: 2023-06-15

## 2023-06-15 RX ORDER — ONDANSETRON 2 MG/ML
INJECTION INTRAMUSCULAR; INTRAVENOUS PRN
Status: DISCONTINUED | OUTPATIENT
Start: 2023-06-15 | End: 2023-06-15

## 2023-06-15 RX ORDER — ACETAMINOPHEN 325 MG/1
975 TABLET ORAL ONCE
Status: CANCELLED | OUTPATIENT
Start: 2023-06-15 | End: 2023-06-15

## 2023-06-15 RX ORDER — ACETAMINOPHEN 325 MG/1
975 TABLET ORAL ONCE
Status: COMPLETED | OUTPATIENT
Start: 2023-06-15 | End: 2023-06-15

## 2023-06-15 RX ORDER — OXYCODONE HYDROCHLORIDE 10 MG/1
10 TABLET ORAL
Status: DISCONTINUED | OUTPATIENT
Start: 2023-06-15 | End: 2023-06-16 | Stop reason: HOSPADM

## 2023-06-15 RX ORDER — ONDANSETRON 2 MG/ML
4 INJECTION INTRAMUSCULAR; INTRAVENOUS EVERY 30 MIN PRN
Status: DISCONTINUED | OUTPATIENT
Start: 2023-06-15 | End: 2023-06-16 | Stop reason: HOSPADM

## 2023-06-15 RX ORDER — FENTANYL CITRATE 0.05 MG/ML
50 INJECTION, SOLUTION INTRAMUSCULAR; INTRAVENOUS EVERY 5 MIN PRN
Status: DISCONTINUED | OUTPATIENT
Start: 2023-06-15 | End: 2023-06-16 | Stop reason: HOSPADM

## 2023-06-15 RX ORDER — KETOROLAC TROMETHAMINE 30 MG/ML
INJECTION, SOLUTION INTRAMUSCULAR; INTRAVENOUS PRN
Status: DISCONTINUED | OUTPATIENT
Start: 2023-06-15 | End: 2023-06-15

## 2023-06-15 RX ADMIN — ACETAMINOPHEN 975 MG: 325 TABLET ORAL at 09:14

## 2023-06-15 RX ADMIN — ONDANSETRON 4 MG: 2 INJECTION INTRAMUSCULAR; INTRAVENOUS at 10:10

## 2023-06-15 RX ADMIN — PROPOFOL 140 MCG/KG/MIN: 10 INJECTION, EMULSION INTRAVENOUS at 10:06

## 2023-06-15 RX ADMIN — FENTANYL CITRATE 50 MCG: 50 INJECTION, SOLUTION INTRAMUSCULAR; INTRAVENOUS at 10:46

## 2023-06-15 RX ADMIN — LIDOCAINE HYDROCHLORIDE 40 MG: 20 INJECTION, SOLUTION INFILTRATION; PERINEURAL at 10:06

## 2023-06-15 RX ADMIN — FENTANYL CITRATE 25 MCG: 0.05 INJECTION, SOLUTION INTRAMUSCULAR; INTRAVENOUS at 11:18

## 2023-06-15 RX ADMIN — PROPOFOL 20 MG: 10 INJECTION, EMULSION INTRAVENOUS at 10:08

## 2023-06-15 RX ADMIN — KETOROLAC TROMETHAMINE 15 MG: 30 INJECTION, SOLUTION INTRAMUSCULAR; INTRAVENOUS at 10:45

## 2023-06-15 RX ADMIN — FENTANYL CITRATE 50 MCG: 50 INJECTION, SOLUTION INTRAMUSCULAR; INTRAVENOUS at 10:06

## 2023-06-15 RX ADMIN — SODIUM CHLORIDE, SODIUM LACTATE, POTASSIUM CHLORIDE, CALCIUM CHLORIDE: 600; 310; 30; 20 INJECTION, SOLUTION INTRAVENOUS at 09:39

## 2023-06-15 RX ADMIN — MAGNESIUM SULFATE HEPTAHYDRATE 2 G: 40 INJECTION, SOLUTION INTRAVENOUS at 11:04

## 2023-06-15 RX ADMIN — FENTANYL CITRATE 25 MCG: 0.05 INJECTION, SOLUTION INTRAMUSCULAR; INTRAVENOUS at 11:30

## 2023-06-15 RX ADMIN — OXYCODONE HYDROCHLORIDE 5 MG: 5 TABLET ORAL at 11:29

## 2023-06-15 NOTE — ANESTHESIA POSTPROCEDURE EVALUATION
Patient: Nadiya De La Torre    Procedure: Procedure(s):  HYSTEROSCOPY, WITH DILATION AND CURETTAGE WITH PAPANCOLAOU SMEAR       Anesthesia Type:  MAC    Note:  Disposition: Outpatient   Postop Pain Control: Uneventful            Sign Out: Well controlled pain   PONV: No   Neuro/Psych: Uneventful            Sign Out: Acceptable/Baseline neuro status   Airway/Respiratory: Uneventful            Sign Out: Acceptable/Baseline resp. status   CV/Hemodynamics: Uneventful            Sign Out: Acceptable CV status; No obvious hypovolemia; No obvious fluid overload   Other NRE: NONE   DID A NON-ROUTINE EVENT OCCUR? No           Last vitals:  Vitals Value Taken Time   /78 06/15/23 1216   Temp 97  F (36.1  C) 06/15/23 1054   Pulse 81 06/15/23 1219   Resp 16 06/15/23 1055   SpO2 88 % 06/15/23 1219   Vitals shown include unvalidated device data.    Electronically Signed By: Dank Palencia MD  Haley 15, 2023  1:46 PM

## 2023-06-15 NOTE — ANESTHESIA PREPROCEDURE EVALUATION
Anesthesia Pre-Procedure Evaluation    Patient: Nadiya De La Torre   MRN: [id number] : 1965        Procedure : Procedure(s):  HYSTEROSCOPY, WITH DILATION AND CURETTAGE WITH PAPANCOLAOU SMEAR          Past Medical History:   Diagnosis Date     Abscess of Bartholin's gland      Arthritis 10 years ?    osteoarthritis in knee     Fibrocystic breast       Past Surgical History:   Procedure Laterality Date     BIOPSY  2021    uterus     CA DRAIN PILONIDAL CYST SIMPL       Incision And Drainage Of Pilonidal Cyst, Simple;  Recorded: 2010;     CA HYSTEROSCOPY,W/ENDO BX N/A 2021    Procedure: EXAM UNDER ANESTHESIA, PAPANCOLAOU SMEAR, HYSTEROSCOPY, WITH DILATION AND CURETTAGE;  Surgeon: Aleida Duff MD;  Location: [facility];  Service: Gynecology     CA I&D BARTHOLIN GLAND ABSCESS  2001     REMV PILONIDAL LESION SIMPLE      Pilonidal Cyst Resection;  Recorded: 2010;     WISDOM TOOTH EXTRACTION        Allergies   Allergen Reactions     Soap      [place]     Sulfa (Sulfonamide Antibiotics) [Sulfa Antibiotics] Unknown      Social History     Tobacco Use     Smoking status: Never     Smokeless tobacco: Never   Vaping Use     Vaping status: Never Used   Substance Use Topics     Alcohol use: Yes     Comment: occasional      Wt Readings from Last 1 Encounters:   23 101.7 kg (224 lb 3.2 oz)        Anesthesia Evaluation   Pt has had prior anesthetic.     No history of anesthetic complications       ROS/MED HX  ENT/Pulmonary:  - neg pulmonary ROS     Neurologic:  - neg neurologic ROS     Cardiovascular:  - neg cardiovascular ROS     METS/Exercise Tolerance: >4 METS    Hematologic:  - neg hematologic  ROS     Musculoskeletal:  - neg musculoskeletal ROS (+) arthritis,     GI/Hepatic:  - neg GI/hepatic ROS     Renal/Genitourinary:  - neg Renal ROS     Endo:  - neg endo ROS   (+) Obesity (bmi 41),     Psychiatric/Substance Use:  - neg psychiatric ROS     Infectious Disease:  - neg  infectious disease ROS     Malignancy:  - neg malignancy ROS     Other:  - neg other ROS          Physical Exam    Airway        Mallampati: III   TM distance: > 3 FB   Neck ROM: full   Mouth opening: > 3 cm    Respiratory Devices and Support         Dental       (+) Minor Abnormalities - some fillings, tiny chips    B=Bridge, C=Chipped, L=Loose, M=Missing    Cardiovascular   cardiovascular exam normal          Pulmonary   pulmonary exam normal                OUTSIDE LABS:  CBC:   Lab Results   Component Value Date    WBC 10.3 06/12/2023    WBC 8.9 04/12/2021    HGB 13.8 06/12/2023    HGB 13.0 06/07/2022    HCT 40.2 06/12/2023    HCT 38.3 04/12/2021     06/12/2023     04/12/2021     BMP:   Lab Results   Component Value Date     06/12/2023     06/07/2022    POTASSIUM 4.1 06/12/2023    POTASSIUM 4.3 06/07/2022    CHLORIDE 101 06/12/2023    CHLORIDE 100 06/07/2022    CO2 24 06/12/2023    CO2 25 06/07/2022    BUN 10.0 06/12/2023    BUN 12 06/07/2022    CR 0.71 06/12/2023    CR 0.75 06/07/2022     (H) 06/12/2023     06/07/2022     COAGS: No results found for: PTT, INR, FIBR  POC: No results found for: BGM, HCG, HCGS  HEPATIC: No results found for: ALBUMIN, PROTTOTAL, ALT, AST, GGT, ALKPHOS, BILITOTAL, BILIDIRECT, MICK  OTHER:   Lab Results   Component Value Date    A1C 5.7 09/18/2015    JASMEET 9.6 06/12/2023       Anesthesia Plan    ASA Status:  3   NPO Status:  NPO Appropriate    Anesthesia Type: MAC.     - Reason for MAC: immobility needed, straight local not clinically adequate   Induction: Propofol.   Maintenance: TIVA.        Consents    Anesthesia Plan(s) and associated risks, benefits, and realistic alternatives discussed. Questions answered and patient/representative(s) expressed understanding.    - Discussed:     - Discussed with:  Patient         Postoperative Care    Pain management: Multi-modal analgesia.   PONV prophylaxis: Ondansetron (or other 5HT-3), Dexamethasone or  Solumedrol     Comments:    Other Comments: Toradol if OK with surgeon    Reviewed anesthetic options and risks. Patient agrees to proceed.             Dank Palencia MD

## 2023-06-15 NOTE — OP NOTE
Hysteroscopy, D&C     Name: Nadiya De La Torre   MRN: [id number]   DATE OF SERVICE: 6/15/2023     PREOPERATIVE DIAGNOSIS:   1. PMB, thickened lining on US  2. History of cervical stenosis, declined EMB in office  3. Cervical cancer screening    POSTOPERATIVE DIAGNOSIS: same, s/p hysteroscopy, D&C, pap and polypectomy    PROCEDURES: Hysteroscopy, dilatation and curettage,     SURGEON: Aleida Duff MD       ANESTHESIA: mac    ESTIMATED BLOOD LOSS: 5 cc     CONESENT:   This is a 57 year old female with PMB, US showed thickened lining and sampling recommended.  She has a history of cervical stenosis and had pain with EMB attempt in the past, she has had 2 prior hysteroscopy D&C's for PMB.  Risks, benefits and alternatives reviewed and she desired to proceed.    PROCEDURE:   The patient was brought to the operating room and after induction of mac anesthesia was prepped and draped in sterile fashion in the dorsal lithotomy position. A time out was called and the patient and the procedure were verified. Pap was done prior to prep.    The bladder was drained with a straight catheter.  A sterile open sided speculum was placed. The anterior lip of the cervix were grasped with single tooth tenaculum. The cervix was stenotic but gently dilated using dilators and the hysteroscope was introduced. Cavity of the uterus was noted to be fluffy, initially a polyp was present obstructing visualization of the cavity.  Hysteroscopy removed and polyp forceps inserted, polyp was grasped and removed.  Hysteroscope inserted again.  No other focal pathology was visualized, endometrium appeared diffusely thickened and fluffy. At this point endometrial curettings were obtained using a sharp curette. All quadrants were sampled, and the tissue was submitted for pathologic exam. At this point good hemostasis was noted. Instruments were removed from vagina. No active bleeding was noted. Tenaculum sites hemostatic with silver nitrite.  Sponge and  needle counts were correct X 2. She was taken to recovery in stable condition.     Aleida Duff MD

## 2023-06-15 NOTE — DISCHARGE INSTRUCTIONS
You have received 975 mg of Acetaminophen (Tylenol) at 9:14 am. Please do not take an additional dose of Tylenol until after 3:14 pm     Do not exceed 4,000 mg of acetaminophen during a 24 hour period and keep in mind that acetaminophen can also be found in many over-the-counter cold medications as well as narcotics that may be given for pain.     You received a medication called Toradol (a stronger IV ibuprofen) at 10:45 am. Do NOT take any Ibuprofen / Advil / Aleve / Naproxen or products containing Ibuprofen until 4:45 pm or later.     If you have any questions or concerns regarding your procedure, please contact Dr. Duff, her office number is [phone].     Discharge Instructions: After Your Surgery    You ve just had surgery. During surgery, you were given medicine called anesthesia to keep you relaxed and free of pain. After surgery, you may have some pain or nausea. This is common. Here are some tips for feeling better and getting well after surgery.    Going home    Your healthcare provider will show you how to take care of yourself when you go home. He or she will also answer your questions. Have an adult family member or friend drive you home. For the first 24 hours after your surgery:  Don't drive or use heavy equipment.  Don't make important decisions or sign legal papers.  Don't drink alcohol.  Have an adult stay with you for 24 hours. He or she can watch for problems and help keep you safe.  Be sure to go to all follow-up visits with your healthcare provider. And rest after your surgery for as long as your healthcare provider tells you to.    Coping with pain    If you have pain after surgery, pain medicine will help you feel better. Take it as told, before pain becomes severe. Also, ask your healthcare provider or pharmacist about other ways to control pain. This might be with heat, ice, or relaxation. And follow any other instructions your surgeon or nurse gives you.    Tips for taking pain  medicine    To get the best relief possible, remember these points:  Pain medicines can upset your stomach. Taking them with a little food may help.  Most pain relievers taken by mouth need at least 20 to 30 minutes to start to work.  Don't wait till your pain becomes severe before you take your medicine. Try to time your medicine so that you can take it before starting an activity. This might be before you get dressed, go for a walk, or sit down for dinner.  Constipation is a common side effect of pain medicines. It's ok to take medicines such as laxatives or stool softeners to help ease constipation. Also ask if you should skip any foods. Drinking lots of fluids and eating foods such as fruits and vegetables that are high in fiber can also help.  Drinking alcohol and taking pain medicine can cause dizziness and slow your breathing. It can even be deadly. Don't drink alcohol while taking pain medicine.  Pain medicine can make you react more slowly to things. Don't drive or run machinery while taking pain medicine.  Your healthcare provider may tell you to take acetaminophen to help ease your pain. Ask him or her how much you are supposed to take each day. Acetaminophen or other pain relievers may interact with your prescription medicines or other over-the-counter (OTC) medicines. Some prescription medicines have acetaminophen and other ingredients. Using both prescription and OTC acetaminophen for pain can cause you to overdose. Read the labels on your OTC medicines with care. This will help you to clearly know the list of ingredients, how much to take, and any warnings. It may also help you not take too much acetaminophen. If you have questions or don't understand the information, ask your pharmacist or healthcare provider to explain it to you before you take the OTC medicine.    Managing nausea    Some people have an upset stomach after surgery. This is often because of anesthesia, pain, or pain medicine, or the  stress of surgery. These tips will help you handle nausea and eat healthy foods as you get better. If you were on a special food plan before surgery, ask your healthcare provider if you should follow it while you get better. These tips may help:    Don't push yourself to eat. Your body will tell you when to eat and how much.  Start off with clear liquids and soup. They are easier to digest.  Next try semi-solid foods, such as mashed potatoes, applesauce, and gelatin, as you feel ready.  Slowly move to solid foods. Don t eat fatty, rich, or spicy foods at first.  Don't force yourself to have 3 large meals a day. Instead eat smaller amounts more often.  Take pain medicines with a small amount of solid food, such as crackers or toast, to prevent nausea.    When to call your healthcare provider    Call your healthcare provider if:  You still have intolerable pain an hour after taking medicine. The medicine may not be strong enough.  You feel too sleepy, dizzy, or groggy. The medicine may be too strong.  You have side effects such as nausea or vomiting, or skin changes such as rash, itching, or hives. Your healthcare provider may suggest other medicines to control side effects.  Rash, itching, or hives may mean you have an allergic reaction. Report this right away. If you have trouble breathing or facial swelling, call 911 right away.    If you have obstructive sleep apnea    You were given anesthesia medicine during surgery to keep you comfortable and free of pain. After surgery, you may have more apnea spells because of this medicine and other medicines you were given. The spells may last longer than usual.   At home:  Keep using the continuous positive airway pressure (CPAP) device when you sleep. Unless your healthcare provider tells you not to, use it when you sleep, day or night. CPAP is a common device used to treat obstructive sleep apnea.  Talk with your provider before taking any pain medicine, muscle  relaxants, or sedatives. Your provider will tell you about the possible dangers of taking these medicines.

## 2023-06-15 NOTE — H&P
H&P from 06/12/2023 reviewed, reviewed risks, benefits and alternatives to hysteroscopy with D&C.  PMB with thickened lining on US, patient declines EMB in the office.  Tissue sample recommended.  Will also complete pap.    MD Bhargav

## 2023-06-15 NOTE — ANESTHESIA CARE TRANSFER NOTE
Patient: Nadiya De La Torre    Procedure: Procedure(s):  HYSTEROSCOPY, WITH DILATION AND CURETTAGE WITH PAPANCOLAOU SMEAR       Diagnosis: Postmenopausal vaginal bleeding [N95.0]  Diagnosis Additional Information: No value filed.    Anesthesia Type:   MAC     Note:    Oropharynx: oropharynx clear of all foreign objects  Level of Consciousness: drowsy  Oxygen Supplementation: face mask  Level of Supplemental Oxygen (L/min / FiO2): 6  Independent Airway: airway patency satisfactory and stable  Dentition: dentition unchanged  Vital Signs Stable: post-procedure vital signs reviewed and stable  Report to RN Given: handoff report given  Patient transferred to: Phase II    Handoff Report: Identifed the Patient, Identified the Reponsible Provider, Reviewed the pertinent medical history, Discussed the surgical course, Reviewed Intra-OP anesthesia mangement and issues during anesthesia, Set expectations for post-procedure period and Allowed opportunity for questions and acknowledgement of understanding      Vitals:  Vitals Value Taken Time   BP 90/53 06/15/23 1055   Temp     Pulse 76 06/15/23 1053   Resp 16 06/15/23 1055   SpO2 100 % 06/15/23 1055   Vitals shown include unvalidated device data.    Electronically Signed By: Adri Cedeno CRNA, TAE NETTLES  Haley 15, 2023  10:56 AM

## 2023-06-19 LAB
BKR LAB AP GYN ADEQUACY: NORMAL
BKR LAB AP GYN INTERPRETATION: NORMAL
PATH REPORT.COMMENTS IMP SPEC: NORMAL
PATH REPORT.COMMENTS IMP SPEC: NORMAL

## 2023-06-22 ENCOUNTER — LAB (OUTPATIENT)
Dept: LAB | Facility: CLINIC | Age: 58
End: 2023-06-22
Payer: COMMERCIAL

## 2023-06-22 DIAGNOSIS — Z00.00 ROUTINE GENERAL MEDICAL EXAMINATION AT A HEALTH CARE FACILITY: Primary | ICD-10-CM

## 2023-06-23 LAB
HUMAN PAPILLOMA VIRUS 16 DNA: NEGATIVE
HUMAN PAPILLOMA VIRUS 18 DNA: NEGATIVE
HUMAN PAPILLOMA VIRUS FINAL DIAGNOSIS: NORMAL
HUMAN PAPILLOMA VIRUS OTHER HR: NEGATIVE

## 2023-07-10 ENCOUNTER — OFFICE VISIT (OUTPATIENT)
Dept: FAMILY MEDICINE | Facility: CLINIC | Age: 58
End: 2023-07-10
Payer: COMMERCIAL

## 2023-07-10 VITALS
RESPIRATION RATE: 16 BRPM | OXYGEN SATURATION: 96 % | BODY MASS INDEX: 40.72 KG/M2 | SYSTOLIC BLOOD PRESSURE: 126 MMHG | DIASTOLIC BLOOD PRESSURE: 68 MMHG | TEMPERATURE: 98.4 F | HEIGHT: 62 IN | HEART RATE: 98 BPM | WEIGHT: 221.3 LBS

## 2023-07-10 DIAGNOSIS — N82.3 RECTO-VAGINAL FISTULA: ICD-10-CM

## 2023-07-10 DIAGNOSIS — E66.01 MORBID OBESITY (H): ICD-10-CM

## 2023-07-10 DIAGNOSIS — Z00.00 ROUTINE GENERAL MEDICAL EXAMINATION AT A HEALTH CARE FACILITY: Primary | ICD-10-CM

## 2023-07-10 DIAGNOSIS — Z12.31 VISIT FOR SCREENING MAMMOGRAM: ICD-10-CM

## 2023-07-10 DIAGNOSIS — Z76.89 ENCOUNTER TO ESTABLISH CARE WITH NEW DOCTOR: ICD-10-CM

## 2023-07-10 DIAGNOSIS — K44.9 SLIDING HIATAL HERNIA: ICD-10-CM

## 2023-07-10 DIAGNOSIS — K21.00 GASTROESOPHAGEAL REFLUX DISEASE WITH ESOPHAGITIS WITHOUT HEMORRHAGE: ICD-10-CM

## 2023-07-10 PROCEDURE — 99396 PREV VISIT EST AGE 40-64: CPT | Performed by: INTERNAL MEDICINE

## 2023-07-10 PROCEDURE — 99214 OFFICE O/P EST MOD 30 MIN: CPT | Mod: 25 | Performed by: INTERNAL MEDICINE

## 2023-07-10 RX ORDER — FLUCONAZOLE 150 MG/1
150 TABLET ORAL ONCE
Qty: 2 TABLET | Refills: 1 | Status: SHIPPED | OUTPATIENT
Start: 2023-07-10 | End: 2023-07-10

## 2023-07-10 RX ORDER — CEPHALEXIN 500 MG/1
500 CAPSULE ORAL 4 TIMES DAILY
Qty: 40 CAPSULE | Refills: 0 | Status: SHIPPED | OUTPATIENT
Start: 2023-07-10

## 2023-07-10 RX ORDER — ASPIRIN 81 MG/1
81 TABLET ORAL DAILY
COMMUNITY

## 2023-07-10 ASSESSMENT — ENCOUNTER SYMPTOMS
MYALGIAS: 0
DIARRHEA: 0
BREAST MASS: 0
PALPITATIONS: 0
DYSURIA: 0
HEARTBURN: 1
CONSTIPATION: 0
FEVER: 0
JOINT SWELLING: 0
EYE PAIN: 0
ABDOMINAL PAIN: 0
DIZZINESS: 0
COUGH: 0
SHORTNESS OF BREATH: 0
SORE THROAT: 0
ARTHRALGIAS: 1
NERVOUS/ANXIOUS: 0
FREQUENCY: 0
WEAKNESS: 0
NAUSEA: 0
HEMATURIA: 0
PARESTHESIAS: 0
HEADACHES: 0
HEMATOCHEZIA: 0
CHILLS: 0

## 2023-07-10 ASSESSMENT — PAIN SCALES - GENERAL: PAINLEVEL: NO PAIN (0)

## 2023-07-10 NOTE — PATIENT INSTRUCTIONS
Preventive Health Recommendations  Female Ages 50 - 64    Yearly exam: See your health care provider every year in order to  Review health changes.   Discuss preventive care.    Review your medicines if your doctor has prescribed any.    Get a Pap test every three years (unless you have an abnormal result and your provider advises testing more often).  If you get Pap tests with HPV test, you only need to test every 5 years, unless you have an abnormal result.   You do not need a Pap test if your uterus was removed (hysterectomy) and you have not had cancer.  You should be tested each year for STDs (sexually transmitted diseases) if you're at risk.   Have a mammogram every 1 to 2 years.  Have a colonoscopy at age 50, or have a yearly FIT test (stool test). These exams screen for colon cancer.    Have a cholesterol test every 5 years, or more often if advised.  Have a diabetes test (fasting glucose) every three years. If you are at risk for diabetes, you should have this test more often.   If you are at risk for osteoporosis (brittle bone disease), think about having a bone density scan (DEXA).    Shots: Get a flu shot each year. Get a tetanus shot every 10 years.    Nutrition:   Eat at least 5 servings of fruits and vegetables each day.  Eat whole-grain bread, whole-wheat pasta and brown rice instead of white grains and rice.  Get adequate Calcium and Vitamin D.     Lifestyle  Exercise at least 150 minutes a week (30 minutes a day, 5 days a week). This will help you control your weight and prevent disease.  Limit alcohol to one drink per day.  No smoking.   Wear sunscreen to prevent skin cancer.   See your dentist every six months for an exam and cleaning.  See your eye doctor every 1 to 2 years.      Recent Labs   Lab Test 06/07/22  1209   CHOL 203*   HDL 44*   *   TRIG 95     The 10-year ASCVD risk score (Dior MOHAN, et al., 2019) is: 3.4%    Values used to calculate the score:      Age: 57 years      Sex:  Female      Is Non- : No      Diabetic: No      Tobacco smoker: No      Systolic Blood Pressure: 126 mmHg      Is BP treated: No      HDL Cholesterol: 44 mg/dL      Total Cholesterol: 203 mg/dL      If risk greater than 7.5%, then statin therapy should be considered.

## 2023-07-10 NOTE — PROGRESS NOTES
Chief Complaint   Patient presents with     Physical     Consult     Pt is requesting cephalexin   500 mg qid  to take with her out the country trip leaving on 8/29/2023   she states has a chronic rectal fistula that if it flares she needs antibiotics to treat it    Also gets Diflucan 2-3 tablets to treat the yeast infection that she gets if she takes the medication.       Gastric Problem     Uses omeprazole daily          SUBJECTIVE:   CC: Nadiya is an 57 year old who presents for preventive health visit.       7/10/2023     3:58 PM   Additional Questions   Roomed by Elen RATLIFF LPN     Healthy Habits:     Getting at least 3 servings of Calcium per day:  NO    Bi-annual eye exam:  Yes    Dental care twice a year:  NO    Sleep apnea or symptoms of sleep apnea:  None    Diet:  Regular (no restrictions)    Frequency of exercise:  4-5 days/week    Duration of exercise:  Greater than 60 minutes    Taking medications regularly:  Yes    Medication side effects:  None    Additional concerns today:  No        Pt states has Reflux and takes Omeprazole daily with good control of her symptoms.   ( if she is off the medication does have reflux and a cough)     Consult and request for medication: Pt is requesting cephalexin   500 mg qid  to take with her out the country trip leaving on 8/29/2023 and will be gone for a month.    she states has a chronic rectal fistula that if it flares she needs antibiotics to treat it    Also gets Diflucan 2-3 tablets to treat the yeast infection that she gets if she takes the medication.    Pt states only takes if absolutely needed.               Social History     Tobacco Use     Smoking status: Never     Smokeless tobacco: Never   Substance Use Topics     Alcohol use: Yes     Comment: occasional             7/10/2023     9:59 AM   Alcohol Use   Prescreen: >3 drinks/day or >7 drinks/week? No     Reviewed orders with patient.  Reviewed health maintenance and updated orders accordingly - Yes  Labs  reviewed in EPIC  BP Readings from Last 3 Encounters:   07/10/23 126/68   06/15/23 134/72   06/12/23 112/82    Wt Readings from Last 3 Encounters:   07/10/23 100.4 kg (221 lb 4.8 oz)   06/15/23 101.6 kg (224 lb)   06/12/23 101.7 kg (224 lb 3.2 oz)                  Patient Active Problem List   Diagnosis     Obesity     Chronic Reflux Esophagitis     Prediabetes     Vaginal Pain     Perirectal Abscess     Postmenopausal bleeding     Other screening mammogram     Recto-vaginal fistula     Obesity (BMI 35.0-39.9) with comorbidity (H)     Vaginal spotting     Acute pain of left shoulder     Endometrial thickening on ultrasound     Cough     Past Surgical History:   Procedure Laterality Date     BIOPSY  April 2021    uterus     DILATION AND CURETTAGE, OPERATIVE HYSTEROSCOPY, COMBINED N/A 6/15/2023    Procedure: HYSTEROSCOPY, WITH DILATION AND CURETTAGE WITH PAPANCOLAOU SMEAR;  Surgeon: Aleida Duff MD;  Location: [facility]     AK DRAIN PILONIDAL CYST SIMPL       Incision And Drainage Of Pilonidal Cyst, Simple;  Recorded: 01/16/2010;     AK HYSTEROSCOPY,W/ENDO BX N/A 04/29/2021    Procedure: EXAM UNDER ANESTHESIA, PAPANCOLAOU SMEAR, HYSTEROSCOPY, WITH DILATION AND CURETTAGE;  Surgeon: Aleida Duff MD;  Location: [facility];  Service: Gynecology     AK I&D BARTHOLIN GLAND ABSCESS  12/01/2001     REMV PILONIDAL LESION SIMPLE      Pilonidal Cyst Resection;  Recorded: 01/16/2010;     WISDOM TOOTH EXTRACTION         Social History     Tobacco Use     Smoking status: Never     Smokeless tobacco: Never   Substance Use Topics     Alcohol use: Yes     Comment: occasional     Family History   Problem Relation Age of Onset     Diabetes Mother         congestive heart failure, kidney dis, diabetic     Heart Disease Mother         stent placed age 74     Hypertension Mother      Hypothyroidism Mother      Obesity Mother      Myocardial Infarction Father      Neuropathy Sister         peripheral neuropathy ,  depression. aneurysm of brainstem , Zanda     Depression Sister      Anxiety Disorder Sister      Supraventricular tachycardia Sister         ablation at age 45      Aortic aneurysm Brother         PERLA     Diabetes Brother      Hypertension Brother      Mental Illness Brother      Obesity Brother      Other - See Comments Brother         LOWER BACK PAIN  ,BACK FUSION, BROTHER GIRISH     Schizophrenia Brother      Sudden Death Brother 51     Myocardial Infarction Brother      Substance Abuse Brother      Myocardial Infarction Maternal Grandfather      Coronary Artery Disease Maternal Grandfather         MI IN 60'S     Depression Maternal Grandfather      Diabetes Maternal Aunt          AGE 65 COMPLICATIONS OF PNEUMONIA     Coronary Artery Disease Maternal Uncle          IN 50'S         Current Outpatient Medications   Medication Sig Dispense Refill     aspirin 81 MG EC tablet Take 81 mg by mouth daily       calcium carbonate/vitamin D3 (CALCIUM 600 + D,3, ORAL) [CALCIUM CARBONATE/VITAMIN D3 (CALCIUM 600 + D,3, ORAL)] Take by mouth.       cephALEXin (KEFLEX) 500 MG capsule Take 1 capsule (500 mg) by mouth 4 times daily 40 capsule 0     fluconazole (DIFLUCAN) 150 MG tablet Take 1 tablet (150 mg) by mouth once for 1 dose May repeat in 24 hours if not improving. 2 tablet 1     mv-mn/folic ac/calcium/vit K1 (WOMEN'S 50 PLUS MULTIVITAMIN ORAL) [MV-MN/FOLIC AC/CALCIUM/VIT K1 (WOMEN'S 50 PLUS MULTIVITAMIN ORAL)] Take by mouth.       omeprazole (PRILOSEC) 20 MG DR capsule [OMEPRAZOLE (PRILOSEC) 20 MG CAPSULE] TAKE 1 CAPSULE BY MOUTH ONE TIME DAILY BEFORE A MEAL 90 capsule 3     Allergies   Allergen Reactions     Soap      [facility]     Sulfa Antibiotics Unknown     Recent Labs   Lab Test 23  1443 22  1209 21  1428 18  1717 18  1717 09/18/15  1659   A1C  --   --   --   --   --  5.7   LDL  --  140*  --   --   --   --    HDL  --  44*  --   --   --   --    TRIG  --  95  --   --   --   --     CR 0.71 0.75 0.67   < > 0.72  --    GFRESTIMATED >90 >90 >60   < > >60  --    GFRESTBLACK  --   --  >60  --  >60  --    POTASSIUM 4.1 4.3 4.4   < > 4.1  --     < > = values in this interval not displayed.        Breast Cancer Screenin/7/2022    11:17 AM 6/10/2022    10:26 AM   Breast CA Risk Assessment (FHS-7)   Do you have a family history of breast, colon, or ovarian cancer? No / Unknown No / Unknown         Mammogram Screening: Recommended mammography every 1-2 years with patient discussion and risk factor consideration  Pertinent mammograms are reviewed under the imaging tab.    History of abnormal Pap smear:       Latest Ref Rng & Units 6/15/2023    10:22 AM 2021     8:36 AM 2014    10:13 AM   PAP / HPV   PAP  Negative for Intraepithelial Lesion or Malignancy (NILM)  Negative for squamous intraepithelial lesion or malignancy  Electronically signed by Kandi Josue CT (ASCP) on 2021 at  4:57 PM    Negative for squamous intraepithelial lesion or malignancy  Electronically signed by Fang Gallagher CT (ASCP) on 2014 at  2:17 PM      HPV 16 DNA Negative Negative  Negative     HPV 18 DNA Negative Negative  Negative     Other HR HPV Negative Negative  Negative       Reviewed and updated as needed this visit by clinical staff   Tobacco  Allergies  Meds  Problems  Med Hx  Surg Hx  Fam Hx          Reviewed and updated as needed this visit by Provider   Tobacco  Allergies  Meds  Problems  Med Hx  Surg Hx  Fam Hx         Past Medical History:   Diagnosis Date     Abscess of Bartholin's gland      Arthritis 10 years ?    osteoarthritis in knee     Fibrocystic breast       Past Surgical History:   Procedure Laterality Date     BIOPSY  2021    uterus     DILATION AND CURETTAGE, OPERATIVE HYSTEROSCOPY, COMBINED N/A 6/15/2023    Procedure: HYSTEROSCOPY, WITH DILATION AND CURETTAGE WITH PAPANCOLAOU SMEAR;  Surgeon: Aleida Duff MD;  Location: [facility]  "    IN DRAIN PILONIDAL CYST SIMPL       Incision And Drainage Of Pilonidal Cyst, Simple;  Recorded: 01/16/2010;     IN HYSTEROSCOPY,W/ENDO BX N/A 04/29/2021    Procedure: EXAM UNDER ANESTHESIA, PAPANCOLAOU SMEAR, HYSTEROSCOPY, WITH DILATION AND CURETTAGE;  Surgeon: Aleida Duff MD;  Location: [facility];  Service: Gynecology     IN I&D BARTHOLIN GLAND ABSCESS  12/01/2001     REMV PILONIDAL LESION SIMPLE      Pilonidal Cyst Resection;  Recorded: 01/16/2010;     WISDOM TOOTH EXTRACTION         Review of Systems   Constitutional: Negative for chills and fever.   HENT: Negative for congestion, ear pain, hearing loss and sore throat.    Eyes: Negative for pain and visual disturbance.   Respiratory: Negative for cough and shortness of breath.    Cardiovascular: Negative for chest pain, palpitations and peripheral edema.   Gastrointestinal: Positive for heartburn. Negative for abdominal pain, constipation, diarrhea, hematochezia and nausea.   Breasts:  Negative for tenderness, breast mass and discharge.   Genitourinary: Negative for dysuria, frequency, genital sores, hematuria, pelvic pain, urgency, vaginal bleeding and vaginal discharge.   Musculoskeletal: Positive for arthralgias. Negative for joint swelling and myalgias.   Skin: Negative for rash.   Neurological: Negative for dizziness, weakness, headaches and paresthesias.   Psychiatric/Behavioral: Negative for mood changes. The patient is not nervous/anxious.           OBJECTIVE:   /68   Pulse 107   Temp 98.4  F (36.9  C) (Oral)   Resp 16   Ht 1.581 m (5' 2.25\")   Wt 100.4 kg (221 lb 4.8 oz)   SpO2 96%   BMI 40.15 kg/m    Physical Exam  GENERAL: healthy, alert and no distress  NECK: no adenopathy, no asymmetry, masses, or scars and thyroid normal to palpation  RESP: lungs clear to auscultation - no rales, rhonchi or wheezes  BREAST: normal without masses, tenderness or nipple discharge and no palpable axillary masses or adenopathy  CV: regular " "rate and rhythm, normal S1 S2, no S3 or S4, no murmur, click or rub, no peripheral edema and peripheral pulses strong  ABDOMEN: soft, nontender,  and bowel sounds normal   (female): no suprapubic tenderness;   MS: no gross musculoskeletal defects noted, no edema  NEURO: Normal strength and tone, mentation intact and speech normal    Diagnostic Test Results:  Labs reviewed in Epic              ASSESSMENT/PLAN:   (Z00.00) Routine general medical examination at a health care facility  (primary encounter diagnosis)  Comment: colonoscopy 2014; mammogram; reviewed immunizations;   Plan:     (K21.00) Gastroesophageal reflux disease with esophagitis without hemorrhage  Comment: monitoring labs; meds reviewed;   Plan: omeprazole (PRILOSEC) 20 MG DR capsule        Pt reminded of benefits of maximizing calcium and vit D to help with bone health (due to being on PPI)     (E66.01) Morbid obesity (H)  Comment: BMI 40.15  Plan: weight loss encouraged; healthy diet and regular exercise.     (N82.3) Recto-vaginal fistula  Comment: flares every 12-18 months since mid 30's; Treated with sitz bath; Cephalexin resolves infection; Past Colorectal evaluation without specific finding of fistula; it has been one year since last outbreak. She expresses concern about upcoming travel to [place] and would like to have Rx for antibiotics /antifungal therapy  \"Just in Case\";  It would  be very challenging to have an outbreak when out of the country in August.  Will provide medication Rxs   Plan: cephALEXin (KEFLEX) 500 MG capsule, fluconazole        (DIFLUCAN) 150 MG tablet          (Z76.89) Encounter to establish care with new doctor  Comment: establish care; she has retired; past provider left the organization;   Plan: she would like to establish care with another provider    (Z12.31) Visit for screening mammogram  Comment: Clinical Breast Exam reviewed  Plan: MA Screen Bilateral w/Tre,             Patient has been advised of split billing " "requirements and indicates understanding: Yes      COUNSELING:  Reviewed preventive health counseling, as reflected in patient instructions       Regular exercise       Healthy diet/nutrition      BMI:   Estimated body mass index is 40.15 kg/m  as calculated from the following:    Height as of this encounter: 1.581 m (5' 2.25\").    Weight as of this encounter: 100.4 kg (221 lb 4.8 oz).   Weight management plan: Discussed healthy diet and exercise guidelines      She reports that she has never smoked. She has never used smokeless tobacco.      Leela Ramos MD  Internal Medicine   [facility]    30 minutes in addition to HEALTH CARE MAINTENANCE are spent with patient, over 50% of that time spent providing counselling, discussing and reviewing medical conditions/concerns, meds and potential side effects.   "

## 2023-07-19 ENCOUNTER — ANCILLARY PROCEDURE (OUTPATIENT)
Dept: MAMMOGRAPHY | Facility: CLINIC | Age: 58
End: 2023-07-19
Attending: INTERNAL MEDICINE
Payer: COMMERCIAL

## 2023-07-19 DIAGNOSIS — Z12.31 VISIT FOR SCREENING MAMMOGRAM: ICD-10-CM

## 2023-07-19 PROCEDURE — 77063 BREAST TOMOSYNTHESIS BI: CPT | Mod: TC | Performed by: RADIOLOGY

## 2023-07-19 PROCEDURE — 77067 SCR MAMMO BI INCL CAD: CPT | Mod: TC | Performed by: RADIOLOGY

## 2024-01-17 ENCOUNTER — LAB REQUISITION (OUTPATIENT)
Dept: LAB | Facility: CLINIC | Age: 59
End: 2024-01-17
Payer: COMMERCIAL

## 2024-01-17 DIAGNOSIS — N95.0 POSTMENOPAUSAL BLEEDING: ICD-10-CM

## 2024-01-17 PROCEDURE — 84443 ASSAY THYROID STIM HORMONE: CPT | Mod: ORL | Performed by: STUDENT IN AN ORGANIZED HEALTH CARE EDUCATION/TRAINING PROGRAM

## 2024-01-19 LAB — TSH SERPL DL<=0.005 MIU/L-ACNC: 2.09 UIU/ML (ref 0.3–4.2)

## 2024-03-06 ENCOUNTER — OFFICE VISIT (OUTPATIENT)
Dept: FAMILY MEDICINE | Facility: CLINIC | Age: 59
End: 2024-03-06
Payer: COMMERCIAL

## 2024-03-06 VITALS
RESPIRATION RATE: 20 BRPM | SYSTOLIC BLOOD PRESSURE: 143 MMHG | BODY MASS INDEX: 40.39 KG/M2 | HEART RATE: 96 BPM | TEMPERATURE: 98.6 F | OXYGEN SATURATION: 98 % | WEIGHT: 219.5 LBS | HEIGHT: 62 IN | DIASTOLIC BLOOD PRESSURE: 84 MMHG

## 2024-03-06 DIAGNOSIS — N82.3 RECTO-VAGINAL FISTULA: ICD-10-CM

## 2024-03-06 DIAGNOSIS — Z01.818 PREOP GENERAL PHYSICAL EXAM: Primary | ICD-10-CM

## 2024-03-06 DIAGNOSIS — N95.0 POSTMENOPAUSAL BLEEDING: ICD-10-CM

## 2024-03-06 LAB
ERYTHROCYTE [DISTWIDTH] IN BLOOD BY AUTOMATED COUNT: 13.5 % (ref 10–15)
HCT VFR BLD AUTO: 39.6 % (ref 35–47)
HGB BLD-MCNC: 13.4 G/DL (ref 11.7–15.7)
HOLD SPECIMEN: NORMAL
MCH RBC QN AUTO: 28.9 PG (ref 26.5–33)
MCHC RBC AUTO-ENTMCNC: 33.8 G/DL (ref 31.5–36.5)
MCV RBC AUTO: 85 FL (ref 78–100)
PLATELET # BLD AUTO: 293 10E3/UL (ref 150–450)
RBC # BLD AUTO: 4.64 10E6/UL (ref 3.8–5.2)
WBC # BLD AUTO: 9.3 10E3/UL (ref 4–11)

## 2024-03-06 PROCEDURE — 36415 COLL VENOUS BLD VENIPUNCTURE: CPT | Performed by: PHYSICIAN ASSISTANT

## 2024-03-06 PROCEDURE — 85027 COMPLETE CBC AUTOMATED: CPT | Performed by: PHYSICIAN ASSISTANT

## 2024-03-06 PROCEDURE — 99214 OFFICE O/P EST MOD 30 MIN: CPT | Performed by: PHYSICIAN ASSISTANT

## 2024-03-06 PROCEDURE — 83036 HEMOGLOBIN GLYCOSYLATED A1C: CPT | Performed by: PHYSICIAN ASSISTANT

## 2024-03-06 PROCEDURE — 80048 BASIC METABOLIC PNL TOTAL CA: CPT | Performed by: PHYSICIAN ASSISTANT

## 2024-03-06 RX ORDER — FLUCONAZOLE 150 MG/1
150 TABLET ORAL PRN
COMMUNITY
Start: 2023-07-10

## 2024-03-06 ASSESSMENT — PAIN SCALES - GENERAL: PAINLEVEL: NO PAIN (0)

## 2024-03-06 NOTE — PROGRESS NOTES
Preoperative Evaluation  [facility]  [address] [facility] [address]  Phone: [phone]  Primary Provider: Silas Walsh  Pre-op Performing Provider: SILAS WALSH  Mar 6, 2024       Nadiya is a 58 year old, presenting for the following:  Pre-Op Exam        3/6/2024     8:38 AM   Additional Questions   Roomed by Lisa Magill, CMA   Accompanied by self         3/6/2024     8:38 AM   Patient Reported Additional Medications   Patient reports taking the following new medications NONE     Surgical Information  Surgery/Procedure: robotic total laparoscopic hysterectomy, bilateral salpingectomy   Surgery Location: [facility]  Surgeon: SUMIT JOY MD  Surgery Date: 03/20/2024  Time of Surgery: 11:05 AM  Where patient plans to recover: At home with family  Fax number for surgical facility: Note does not need to be faxed, will be available electronically in Epic.    Assessment & Plan     The proposed surgical procedure is considered INTERMEDIATE risk.    Preop general physical exam  Ok for procedure.  - CBC with Platelets and Reflex to Iron Studies; Future  - Basic metabolic panel  (Ca, Cl, CO2, Creat, Gluc, K, Na, BUN); Future  - CBC with Platelets and Reflex to Iron Studies  - Basic metabolic panel  (Ca, Cl, CO2, Creat, Gluc, K, Na, BUN)    Postmenopausal bleeding  Follow with GYN    Recto-vaginal fistula  Patient mentions that this is a recurrent issue for her.  Occasionally needs Rx for abx and diflucan to treat a flare.  Has happened about 15 times in the past.            - No identified additional risk factors other than previously addressed    Antiplatelet or Anticoagulation Medication Instructions   - aspirin: Discontinue aspirin 7-10 days prior to procedure to reduce bleeding risk. It should be resumed postoperatively.     Additional Medication Instructions  Patient is to take all scheduled medications on the day of  surgery    Recommendation  APPROVAL GIVEN to proceed with proposed procedure, without further diagnostic evaluation.          Subjective       HPI related to upcoming procedure: patient here for pre op exam prior to having a hysterectomy for post menopausal bleeding.  She is feeling well.        2/29/2024    10:00 PM   Preop Questions   1. Have you ever had a heart attack or stroke? No   2. Have you ever had surgery on your heart or blood vessels, such as a stent placement, a coronary artery bypass, or surgery on an artery in your head, neck, heart, or legs? No   3. Do you have chest pain with activity? No   4. Do you have a history of  heart failure? No   5. Do you currently have a cold, bronchitis or symptoms of other infection? No   6. Do you have a cough, shortness of breath, or wheezing? No   7. Do you or anyone in your family have previous history of blood clots? YES -father/stroke   8. Do you or does anyone in your family have a serious bleeding problem such as prolonged bleeding following surgeries or cuts? No   9. Have you ever had problems with anemia or been told to take iron pills? No   10. Have you had any abnormal blood loss such as black, tarry or bloody stools, or abnormal vaginal bleeding? YES    11. Have you ever had a blood transfusion? No   12. Are you willing to have a blood transfusion if it is medically needed before, during, or after your surgery? Yes   13. Have you or any of your relatives ever had problems with anesthesia? No   14. Do you have sleep apnea, excessive snoring or daytime drowsiness? No   15. Do you have any artifical heart valves or other implanted medical devices like a pacemaker, defibrillator, or continuous glucose monitor? No   16. Do you have artificial joints? No   17. Are you allergic to latex? No   18. Is there any chance that you may be pregnant? No       Health Care Directive  Patient does not have a Health Care Directive or Living Will: Patient states has Advance  Directive and will bring in a copy to clinic.    Preoperative Review of    reviewed - no record of controlled substances prescribed.      Status of Chronic Conditions:  See problem list for active medical problems.  Problems all longstanding and stable, except as noted/documented.  See ROS for pertinent symptoms related to these conditions.    Patient Active Problem List    Diagnosis Date Noted    Cough 06/07/2022     Priority: Medium    Endometrial thickening on ultrasound 04/12/2021     Priority: Medium    Obesity (BMI 35.0-39.9) with comorbidity (H) 02/22/2021     Priority: Medium    Vaginal spotting 02/22/2021     Priority: Medium    Acute pain of left shoulder 02/22/2021     Priority: Medium    Recto-vaginal fistula 04/07/2017     Priority: Medium    Other screening mammogram 04/06/2017     Priority: Medium    Postmenopausal bleeding 06/12/2015     Priority: Medium    Obesity      Priority: Medium     Created by Conversion  [facility] Annotation: Jan 16 2010 11:35Kiera Shannon: BMI 37.1    1/13/2010Weight 212 12/2008        Prediabetes      Priority: Medium     Created by Conversion  [facility] Annotation: Jan 16 2010 11:08Kiera Shannon: Went to   classes        Chronic Reflux Esophagitis      Priority: Medium     Created by Conversion        Vaginal Pain      Priority: Medium     Created by Conversion        Perirectal Abscess      Priority: Medium     Created by Conversion  [facility] Annotation: Jan 16 2010 11:07Kiera Shannon: This is a   perirectal fistula that drains in Right labia          Past Medical History:   Diagnosis Date    Abscess of Bartholin's gland     Arthritis 10 years ?    osteoarthritis in knee    Fibrocystic breast      Past Surgical History:   Procedure Laterality Date    BIOPSY  April 2021    uterus    DILATION AND CURETTAGE, OPERATIVE HYSTEROSCOPY, COMBINED N/A 6/15/2023    Procedure: HYSTEROSCOPY, WITH DILATION AND CURETTAGE WITH PAPANCOLAOU SMEAR;  Surgeon: Omega  "Aleida PUGA MD;  Location: [facility] OR    [place] DRAIN PILONIDAL CYST SIMPL       Incision And Drainage Of Pilonidal Cyst, Simple;  Recorded: 01/16/2010;    [place] HYSTEROSCOPY,W/ENDO BX N/A 04/29/2021    Procedure: EXAM UNDER ANESTHESIA, PAPANCOLAOU SMEAR, HYSTEROSCOPY, WITH DILATION AND CURETTAGE;  Surgeon: Aleida Duff MD;  Location: [facility] OR;  Service: Gynecology    [place] I&D BARTHOLIN GLAND ABSCESS  12/01/2001    REMV PILONIDAL LESION SIMPLE      Pilonidal Cyst Resection;  Recorded: 01/16/2010;    WISDOM TOOTH EXTRACTION       Current Outpatient Medications   Medication Sig Dispense Refill    aspirin 81 MG EC tablet Take 81 mg by mouth daily      calcium carbonate/vitamin D3 (CALCIUM 600 + D,3, ORAL) [CALCIUM CARBONATE/VITAMIN D3 (CALCIUM 600 + D,3, ORAL)] Take by mouth.      cephALEXin (KEFLEX) 500 MG capsule Take 1 capsule (500 mg) by mouth 4 times daily 40 capsule 0    fluconazole (DIFLUCAN) 150 MG tablet Take 100 mg by mouth as needed      mv-mn/folic ac/calcium/vit K1 (WOMEN'S 50 PLUS MULTIVITAMIN ORAL) [MV-MN/FOLIC AC/CALCIUM/VIT K1 (WOMEN'S 50 PLUS MULTIVITAMIN ORAL)] Take by mouth.      omeprazole (PRILOSEC) 20 MG DR capsule [OMEPRAZOLE (PRILOSEC) 20 MG CAPSULE] TAKE 1 CAPSULE BY MOUTH ONE TIME DAILY BEFORE A MEAL 90 capsule 3       Allergies   Allergen Reactions    Soap      [place]    Sulfa Antibiotics Unknown        Social History     Tobacco Use    Smoking status: Never    Smokeless tobacco: Never   Substance Use Topics    Alcohol use: Yes     Comment: occasional       History   Drug Use No         Review of Systems    Review of Systems  Constitutional, HEENT, cardiovascular, pulmonary, gi and gu systems are negative, except as otherwise noted.    Objective    BP (!) 143/84 (BP Location: Right arm, Patient Position: Sitting, Cuff Size: Adult Large)   Pulse 96   Temp 98.6  F (37  C) (Oral)   Resp 20   Ht 1.581 m (5' 2.25\")   Wt 99.6 kg (219 lb 8 oz)   SpO2 98%   BMI 39.83 kg/m   " "  Estimated body mass index is 39.83 kg/m  as calculated from the following:    Height as of this encounter: 1.581 m (5' 2.25\").    Weight as of this encounter: 99.6 kg (219 lb 8 oz).  Physical Exam  GENERAL: alert and no distress  NECK: no adenopathy, no asymmetry, masses, or scars  RESP: lungs clear to auscultation - no rales, rhonchi or wheezes  CV: regular rate and rhythm, normal S1 S2, no S3 or S4, no murmur, click or rub, no peripheral edema  ABDOMEN: soft, nontender, no hepatosplenomegaly, no masses and bowel sounds normal  MS: no gross musculoskeletal defects noted, no edema    Recent Labs   Lab Test 06/12/23  1443 06/07/22  1209   HGB 13.8 13.0     --     138   POTASSIUM 4.1 4.3   CR 0.71 0.75        Diagnostics  Labs pending at this time.  Results will be reviewed when available.   No EKG required, no history of coronary heart disease, significant arrhythmia, peripheral arterial disease or other structural heart disease.    Revised Cardiac Risk Index (RCRI)  The patient has the following serious cardiovascular risks for perioperative complications:   - No serious cardiac risks = 0 points     RCRI Interpretation: 0 points: Class I (very low risk - 0.4% complication rate)         Signed Electronically by: Silas Castillo PA-C  Copy of this evaluation report is provided to requesting physician.         "

## 2024-03-06 NOTE — NURSING NOTE
"Chief Complaint   Patient presents with    Pre-Op Exam     Initial BP (!) 143/84 (BP Location: Right arm, Patient Position: Sitting, Cuff Size: Adult Large)   Pulse 96   Temp 98.6  F (37  C) (Oral)   Resp 20   Ht 1.581 m (5' 2.25\")   Wt 99.6 kg (219 lb 8 oz)   SpO2 98%   BMI 39.83 kg/m   Estimated body mass index is 39.83 kg/m  as calculated from the following:    Height as of this encounter: 1.581 m (5' 2.25\").    Weight as of this encounter: 99.6 kg (219 lb 8 oz).  BP completed using cuff size large right arm    Lisa Magill, CMA    "

## 2024-03-07 LAB
ANION GAP SERPL CALCULATED.3IONS-SCNC: 13 MMOL/L (ref 7–15)
BUN SERPL-MCNC: 10.2 MG/DL (ref 8–23)
CALCIUM SERPL-MCNC: 9.3 MG/DL (ref 8.8–10.2)
CHLORIDE SERPL-SCNC: 101 MMOL/L (ref 98–107)
CREAT SERPL-MCNC: 0.69 MG/DL (ref 0.51–0.95)
DEPRECATED HCO3 PLAS-SCNC: 24 MMOL/L (ref 22–29)
EGFRCR SERPLBLD CKD-EPI 2021: >90 ML/MIN/1.73M2
GLUCOSE SERPL-MCNC: 140 MG/DL (ref 70–99)
POTASSIUM SERPL-SCNC: 4.2 MMOL/L (ref 3.4–5.3)
SODIUM SERPL-SCNC: 138 MMOL/L (ref 135–145)

## 2024-03-10 ENCOUNTER — MYC MEDICAL ADVICE (OUTPATIENT)
Dept: FAMILY MEDICINE | Facility: CLINIC | Age: 59
End: 2024-03-10
Payer: COMMERCIAL

## 2024-03-11 LAB — HBA1C MFR BLD: 6.2 % (ref 0–5.6)

## 2024-03-11 NOTE — TELEPHONE ENCOUNTER
Per chart review- A1c added on. Not resulted yet.     Please see my chart question from patient once reviewed. Thank you.    ALISON Beltran on 3/11/2024 at 9:54 AM

## 2024-03-11 NOTE — TELEPHONE ENCOUNTER
A1C is back and at 6.2% indicating pre diabetes.  Still ok for surgery but if wanting to discuss this further I would suggest an appointment.  Virtual is ok.      Silas

## 2024-03-18 NOTE — OR NURSING
Pt is very anxious regarding the IV start. Pt states that last time she was here it took 7 pokes before they finally got it in her foot. Pt is very addiment that she only wants the IV placed with ultrasound.

## 2024-03-20 ENCOUNTER — HOSPITAL ENCOUNTER (OUTPATIENT)
Facility: HOSPITAL | Age: 59
Discharge: HOME | End: 2024-03-20
Attending: OBSTETRICS & GYNECOLOGY | Admitting: OBSTETRICS & GYNECOLOGY
Payer: COMMERCIAL

## 2024-03-20 ENCOUNTER — ANESTHESIA EVENT (OUTPATIENT)
Dept: SURGERY | Facility: HOSPITAL | Age: 59
End: 2024-03-20
Payer: COMMERCIAL

## 2024-03-20 ENCOUNTER — ANESTHESIA (OUTPATIENT)
Dept: SURGERY | Facility: HOSPITAL | Age: 59
End: 2024-03-20
Payer: COMMERCIAL

## 2024-03-20 ENCOUNTER — DOCUMENTATION ONLY (OUTPATIENT)
Dept: OTHER | Facility: CLINIC | Age: 59
End: 2024-03-20

## 2024-03-20 VITALS
TEMPERATURE: 97.6 F | HEART RATE: 82 BPM | DIASTOLIC BLOOD PRESSURE: 77 MMHG | HEIGHT: 62 IN | WEIGHT: 217 LBS | SYSTOLIC BLOOD PRESSURE: 139 MMHG | OXYGEN SATURATION: 98 % | RESPIRATION RATE: 16 BRPM | BODY MASS INDEX: 39.93 KG/M2

## 2024-03-20 DIAGNOSIS — N95.0 POSTMENOPAUSAL BLEEDING: Primary | ICD-10-CM

## 2024-03-20 LAB
ABO/RH(D): NORMAL
ANTIBODY SCREEN: NEGATIVE
BASOPHILS # BLD AUTO: 0 10E3/UL (ref 0–0.2)
BASOPHILS NFR BLD AUTO: 0 %
EOSINOPHIL # BLD AUTO: 0.1 10E3/UL (ref 0–0.7)
EOSINOPHIL NFR BLD AUTO: 1 %
ERYTHROCYTE [DISTWIDTH] IN BLOOD BY AUTOMATED COUNT: 13.6 % (ref 10–15)
HCT VFR BLD AUTO: 37.6 % (ref 35–47)
HGB BLD-MCNC: 12.6 G/DL (ref 11.7–15.7)
IMM GRANULOCYTES # BLD: 0 10E3/UL
IMM GRANULOCYTES NFR BLD: 0 %
LYMPHOCYTES # BLD AUTO: 1.6 10E3/UL (ref 0.8–5.3)
LYMPHOCYTES NFR BLD AUTO: 18 %
MCH RBC QN AUTO: 28.4 PG (ref 26.5–33)
MCHC RBC AUTO-ENTMCNC: 33.5 G/DL (ref 31.5–36.5)
MCV RBC AUTO: 85 FL (ref 78–100)
MONOCYTES # BLD AUTO: 0.2 10E3/UL (ref 0–1.3)
MONOCYTES NFR BLD AUTO: 3 %
NEUTROPHILS # BLD AUTO: 6.7 10E3/UL (ref 1.6–8.3)
NEUTROPHILS NFR BLD AUTO: 78 %
NRBC # BLD AUTO: 0 10E3/UL
NRBC BLD AUTO-RTO: 0 /100
PLATELET # BLD AUTO: 295 10E3/UL (ref 150–450)
RBC # BLD AUTO: 4.43 10E6/UL (ref 3.8–5.2)
SPECIMEN EXPIRATION DATE: NORMAL
WBC # BLD AUTO: 8.7 10E3/UL (ref 4–11)

## 2024-03-20 PROCEDURE — 250N000011 HC RX IP 250 OP 636: Performed by: NURSE ANESTHETIST, CERTIFIED REGISTERED

## 2024-03-20 PROCEDURE — 88307 TISSUE EXAM BY PATHOLOGIST: CPT | Mod: TC | Performed by: OBSTETRICS & GYNECOLOGY

## 2024-03-20 PROCEDURE — 250N000011 HC RX IP 250 OP 636: Performed by: OBSTETRICS & GYNECOLOGY

## 2024-03-20 PROCEDURE — 250N000009 HC RX 250: Performed by: OBSTETRICS & GYNECOLOGY

## 2024-03-20 PROCEDURE — 272N000001 HC OR GENERAL SUPPLY STERILE: Performed by: OBSTETRICS & GYNECOLOGY

## 2024-03-20 PROCEDURE — 710N000009 HC RECOVERY PHASE 1, LEVEL 1, PER MIN: Performed by: OBSTETRICS & GYNECOLOGY

## 2024-03-20 PROCEDURE — 250N000009 HC RX 250: Performed by: NURSE ANESTHETIST, CERTIFIED REGISTERED

## 2024-03-20 PROCEDURE — 250N000011 HC RX IP 250 OP 636: Performed by: PHYSICIAN ASSISTANT

## 2024-03-20 PROCEDURE — 710N000012 HC RECOVERY PHASE 2, PER MINUTE: Performed by: OBSTETRICS & GYNECOLOGY

## 2024-03-20 PROCEDURE — 36415 COLL VENOUS BLD VENIPUNCTURE: CPT | Performed by: PHYSICIAN ASSISTANT

## 2024-03-20 PROCEDURE — 250N000013 HC RX MED GY IP 250 OP 250 PS 637: Performed by: PHYSICIAN ASSISTANT

## 2024-03-20 PROCEDURE — 250N000011 HC RX IP 250 OP 636: Performed by: ANESTHESIOLOGY

## 2024-03-20 PROCEDURE — 258N000003 HC RX IP 258 OP 636: Performed by: OBSTETRICS & GYNECOLOGY

## 2024-03-20 PROCEDURE — 250N000009 HC RX 250: Performed by: ANESTHESIOLOGY

## 2024-03-20 PROCEDURE — 258N000003 HC RX IP 258 OP 636: Performed by: ANESTHESIOLOGY

## 2024-03-20 PROCEDURE — 250N000013 HC RX MED GY IP 250 OP 250 PS 637: Performed by: ANESTHESIOLOGY

## 2024-03-20 PROCEDURE — 85025 COMPLETE CBC W/AUTO DIFF WBC: CPT | Performed by: PHYSICIAN ASSISTANT

## 2024-03-20 PROCEDURE — 250N000013 HC RX MED GY IP 250 OP 250 PS 637: Performed by: OBSTETRICS & GYNECOLOGY

## 2024-03-20 PROCEDURE — 250N000025 HC SEVOFLURANE, PER MIN: Performed by: OBSTETRICS & GYNECOLOGY

## 2024-03-20 PROCEDURE — 999N000141 HC STATISTIC PRE-PROCEDURE NURSING ASSESSMENT: Performed by: OBSTETRICS & GYNECOLOGY

## 2024-03-20 PROCEDURE — 370N000017 HC ANESTHESIA TECHNICAL FEE, PER MIN: Performed by: OBSTETRICS & GYNECOLOGY

## 2024-03-20 PROCEDURE — 999N000127 HC STATISTIC PERIPHERAL IV START W US GUIDANCE

## 2024-03-20 PROCEDURE — 360N000080 HC SURGERY LEVEL 7, PER MIN: Performed by: OBSTETRICS & GYNECOLOGY

## 2024-03-20 PROCEDURE — 86900 BLOOD TYPING SEROLOGIC ABO: CPT | Performed by: PHYSICIAN ASSISTANT

## 2024-03-20 RX ORDER — BUPIVACAINE HYDROCHLORIDE 2.5 MG/ML
INJECTION, SOLUTION EPIDURAL; INFILTRATION; INTRACAUDAL PRN
Status: DISCONTINUED | OUTPATIENT
Start: 2024-03-20 | End: 2024-03-20 | Stop reason: HOSPADM

## 2024-03-20 RX ORDER — MAGNESIUM SULFATE 4 G/50ML
4 INJECTION INTRAVENOUS ONCE
Status: COMPLETED | OUTPATIENT
Start: 2024-03-20 | End: 2024-03-20

## 2024-03-20 RX ORDER — HYDROMORPHONE HYDROCHLORIDE 1 MG/ML
0.4 INJECTION, SOLUTION INTRAMUSCULAR; INTRAVENOUS; SUBCUTANEOUS EVERY 5 MIN PRN
Status: DISCONTINUED | OUTPATIENT
Start: 2024-03-20 | End: 2024-03-20 | Stop reason: HOSPADM

## 2024-03-20 RX ORDER — CEFAZOLIN SODIUM/WATER 2 G/20 ML
2 SYRINGE (ML) INTRAVENOUS
Status: COMPLETED | OUTPATIENT
Start: 2024-03-20 | End: 2024-03-20

## 2024-03-20 RX ORDER — NALOXONE HYDROCHLORIDE 0.4 MG/ML
0.1 INJECTION, SOLUTION INTRAMUSCULAR; INTRAVENOUS; SUBCUTANEOUS
Status: DISCONTINUED | OUTPATIENT
Start: 2024-03-20 | End: 2024-03-20 | Stop reason: HOSPADM

## 2024-03-20 RX ORDER — KETOROLAC TROMETHAMINE 30 MG/ML
15 INJECTION, SOLUTION INTRAMUSCULAR; INTRAVENOUS ONCE
Status: COMPLETED | OUTPATIENT
Start: 2024-03-20 | End: 2024-03-20

## 2024-03-20 RX ORDER — METHOCARBAMOL 750 MG/1
750 TABLET, FILM COATED ORAL ONCE
Status: COMPLETED | OUTPATIENT
Start: 2024-03-20 | End: 2024-03-20

## 2024-03-20 RX ORDER — SODIUM CHLORIDE, SODIUM LACTATE, POTASSIUM CHLORIDE, AND CALCIUM CHLORIDE .6; .31; .03; .02 G/100ML; G/100ML; G/100ML; G/100ML
IRRIGANT IRRIGATION PRN
Status: DISCONTINUED | OUTPATIENT
Start: 2024-03-20 | End: 2024-03-20 | Stop reason: HOSPADM

## 2024-03-20 RX ORDER — PHENYLEPHRINE HYDROCHLORIDE 10 MG/ML
INJECTION INTRAVENOUS PRN
Status: DISCONTINUED | OUTPATIENT
Start: 2024-03-20 | End: 2024-03-20

## 2024-03-20 RX ORDER — IBUPROFEN 200 MG
800 TABLET ORAL ONCE
Qty: 4 TABLET | Refills: 0 | Status: DISCONTINUED | OUTPATIENT
Start: 2024-03-20 | End: 2024-03-20 | Stop reason: HOSPADM

## 2024-03-20 RX ORDER — FENTANYL CITRATE 50 UG/ML
25 INJECTION, SOLUTION INTRAMUSCULAR; INTRAVENOUS EVERY 5 MIN PRN
Status: DISCONTINUED | OUTPATIENT
Start: 2024-03-20 | End: 2024-03-20 | Stop reason: HOSPADM

## 2024-03-20 RX ORDER — FENTANYL CITRATE 50 UG/ML
INJECTION, SOLUTION INTRAMUSCULAR; INTRAVENOUS PRN
Status: DISCONTINUED | OUTPATIENT
Start: 2024-03-20 | End: 2024-03-20

## 2024-03-20 RX ORDER — SODIUM CHLORIDE, SODIUM LACTATE, POTASSIUM CHLORIDE, CALCIUM CHLORIDE 600; 310; 30; 20 MG/100ML; MG/100ML; MG/100ML; MG/100ML
INJECTION, SOLUTION INTRAVENOUS CONTINUOUS
Status: DISCONTINUED | OUTPATIENT
Start: 2024-03-20 | End: 2024-03-20 | Stop reason: HOSPADM

## 2024-03-20 RX ORDER — ONDANSETRON 2 MG/ML
INJECTION INTRAMUSCULAR; INTRAVENOUS PRN
Status: DISCONTINUED | OUTPATIENT
Start: 2024-03-20 | End: 2024-03-20

## 2024-03-20 RX ORDER — ONDANSETRON 2 MG/ML
4 INJECTION INTRAMUSCULAR; INTRAVENOUS EVERY 30 MIN PRN
Status: DISCONTINUED | OUTPATIENT
Start: 2024-03-20 | End: 2024-03-20 | Stop reason: HOSPADM

## 2024-03-20 RX ORDER — ACETAMINOPHEN 325 MG/1
975 TABLET ORAL ONCE
Qty: 3 TABLET | Refills: 0 | Status: COMPLETED | OUTPATIENT
Start: 2024-03-20 | End: 2024-03-20

## 2024-03-20 RX ORDER — ACETAMINOPHEN 325 MG/1
975 TABLET ORAL ONCE
Status: COMPLETED | OUTPATIENT
Start: 2024-03-20 | End: 2024-03-20

## 2024-03-20 RX ORDER — OXYCODONE HYDROCHLORIDE 5 MG/1
5-10 TABLET ORAL EVERY 4 HOURS PRN
Qty: 12 TABLET | Refills: 0 | Status: SHIPPED | OUTPATIENT
Start: 2024-03-20 | End: 2024-07-08

## 2024-03-20 RX ORDER — FENTANYL CITRATE 50 UG/ML
50 INJECTION, SOLUTION INTRAMUSCULAR; INTRAVENOUS EVERY 5 MIN PRN
Status: DISCONTINUED | OUTPATIENT
Start: 2024-03-20 | End: 2024-03-20 | Stop reason: HOSPADM

## 2024-03-20 RX ORDER — OXYCODONE HYDROCHLORIDE 5 MG/1
5 TABLET ORAL
Status: COMPLETED | OUTPATIENT
Start: 2024-03-20 | End: 2024-03-20

## 2024-03-20 RX ORDER — PROPOFOL 10 MG/ML
INJECTION, EMULSION INTRAVENOUS CONTINUOUS PRN
Status: DISCONTINUED | OUTPATIENT
Start: 2024-03-20 | End: 2024-03-20

## 2024-03-20 RX ORDER — PROPOFOL 10 MG/ML
INJECTION, EMULSION INTRAVENOUS PRN
Status: DISCONTINUED | OUTPATIENT
Start: 2024-03-20 | End: 2024-03-20

## 2024-03-20 RX ORDER — HYDROMORPHONE HYDROCHLORIDE 1 MG/ML
0.2 INJECTION, SOLUTION INTRAMUSCULAR; INTRAVENOUS; SUBCUTANEOUS EVERY 5 MIN PRN
Status: DISCONTINUED | OUTPATIENT
Start: 2024-03-20 | End: 2024-03-20 | Stop reason: HOSPADM

## 2024-03-20 RX ORDER — CEFAZOLIN SODIUM/WATER 2 G/20 ML
2 SYRINGE (ML) INTRAVENOUS SEE ADMIN INSTRUCTIONS
Status: DISCONTINUED | OUTPATIENT
Start: 2024-03-20 | End: 2024-03-20 | Stop reason: HOSPADM

## 2024-03-20 RX ORDER — LIDOCAINE HYDROCHLORIDE 10 MG/ML
INJECTION, SOLUTION INFILTRATION; PERINEURAL PRN
Status: DISCONTINUED | OUTPATIENT
Start: 2024-03-20 | End: 2024-03-20

## 2024-03-20 RX ORDER — ONDANSETRON 4 MG/1
4 TABLET, ORALLY DISINTEGRATING ORAL EVERY 30 MIN PRN
Status: DISCONTINUED | OUTPATIENT
Start: 2024-03-20 | End: 2024-03-20 | Stop reason: HOSPADM

## 2024-03-20 RX ORDER — GLYCOPYRROLATE 0.2 MG/ML
INJECTION, SOLUTION INTRAMUSCULAR; INTRAVENOUS PRN
Status: DISCONTINUED | OUTPATIENT
Start: 2024-03-20 | End: 2024-03-20

## 2024-03-20 RX ORDER — LIDOCAINE 40 MG/G
CREAM TOPICAL
Status: DISCONTINUED | OUTPATIENT
Start: 2024-03-20 | End: 2024-03-20 | Stop reason: HOSPADM

## 2024-03-20 RX ADMIN — ROCURONIUM BROMIDE 50 MG: 50 INJECTION, SOLUTION INTRAVENOUS at 13:45

## 2024-03-20 RX ADMIN — ACETAMINOPHEN 975 MG: 325 TABLET ORAL at 11:24

## 2024-03-20 RX ADMIN — FENTANYL CITRATE 50 MCG: 50 INJECTION, SOLUTION INTRAMUSCULAR; INTRAVENOUS at 15:47

## 2024-03-20 RX ADMIN — OXYCODONE HYDROCHLORIDE 5 MG: 5 TABLET ORAL at 16:42

## 2024-03-20 RX ADMIN — FENTANYL CITRATE 100 MCG: 50 INJECTION INTRAMUSCULAR; INTRAVENOUS at 13:44

## 2024-03-20 RX ADMIN — PHENYLEPHRINE HYDROCHLORIDE 100 MCG: 10 INJECTION INTRAVENOUS at 14:08

## 2024-03-20 RX ADMIN — PROPOFOL 200 MG: 10 INJECTION, EMULSION INTRAVENOUS at 13:44

## 2024-03-20 RX ADMIN — PROPOFOL 50 MCG/KG/MIN: 10 INJECTION, EMULSION INTRAVENOUS at 13:51

## 2024-03-20 RX ADMIN — KETOROLAC TROMETHAMINE 15 MG: 30 INJECTION, SOLUTION INTRAMUSCULAR; INTRAVENOUS at 15:56

## 2024-03-20 RX ADMIN — LIDOCAINE HYDROCHLORIDE 50 MG: 10 INJECTION, SOLUTION INFILTRATION; PERINEURAL at 13:43

## 2024-03-20 RX ADMIN — GLYCOPYRROLATE 0.2 MG: 0.2 INJECTION INTRAMUSCULAR; INTRAVENOUS at 13:43

## 2024-03-20 RX ADMIN — SODIUM CHLORIDE, POTASSIUM CHLORIDE, SODIUM LACTATE AND CALCIUM CHLORIDE: 600; 310; 30; 20 INJECTION, SOLUTION INTRAVENOUS at 13:32

## 2024-03-20 RX ADMIN — SODIUM CHLORIDE, POTASSIUM CHLORIDE, SODIUM LACTATE AND CALCIUM CHLORIDE: 600; 310; 30; 20 INJECTION, SOLUTION INTRAVENOUS at 11:07

## 2024-03-20 RX ADMIN — ONDANSETRON 2 MG: 2 INJECTION INTRAMUSCULAR; INTRAVENOUS at 14:50

## 2024-03-20 RX ADMIN — FENTANYL CITRATE 25 MCG: 50 INJECTION, SOLUTION INTRAMUSCULAR; INTRAVENOUS at 15:24

## 2024-03-20 RX ADMIN — ACETAMINOPHEN 975 MG: 325 TABLET ORAL at 16:42

## 2024-03-20 RX ADMIN — METHOCARBAMOL 750 MG: 750 TABLET ORAL at 11:15

## 2024-03-20 RX ADMIN — MAGNESIUM SULFATE HEPTAHYDRATE 4 G: 80 INJECTION, SOLUTION INTRAVENOUS at 11:31

## 2024-03-20 RX ADMIN — FENTANYL CITRATE 25 MCG: 50 INJECTION, SOLUTION INTRAMUSCULAR; INTRAVENOUS at 15:34

## 2024-03-20 RX ADMIN — PROCHLORPERAZINE EDISYLATE 5 MG: 5 INJECTION INTRAMUSCULAR; INTRAVENOUS at 15:45

## 2024-03-20 RX ADMIN — Medication 2 G: at 13:42

## 2024-03-20 RX ADMIN — HYDROMORPHONE HYDROCHLORIDE 0.2 MG: 1 INJECTION, SOLUTION INTRAMUSCULAR; INTRAVENOUS; SUBCUTANEOUS at 16:14

## 2024-03-20 RX ADMIN — ONDANSETRON 2 MG: 2 INJECTION INTRAMUSCULAR; INTRAVENOUS at 13:43

## 2024-03-20 RX ADMIN — MIDAZOLAM 2 MG: 1 INJECTION INTRAMUSCULAR; INTRAVENOUS at 13:37

## 2024-03-20 RX ADMIN — SUGAMMADEX 200 MG: 100 INJECTION, SOLUTION INTRAVENOUS at 14:55

## 2024-03-20 RX ADMIN — SODIUM CHLORIDE, POTASSIUM CHLORIDE, SODIUM LACTATE AND CALCIUM CHLORIDE: 600; 310; 30; 20 INJECTION, SOLUTION INTRAVENOUS at 14:53

## 2024-03-20 ASSESSMENT — ACTIVITIES OF DAILY LIVING (ADL)
ADLS_ACUITY_SCORE: 35

## 2024-03-20 NOTE — BRIEF OP NOTE
[facility]    Brief Operative Note    Pre-operative diagnosis: Postmenopausal bleeding [N95.0]  Post-operative diagnosis Same + pelvic adhesions    Procedure: ROBOTIC TOTAL LAPAROSCOPIC HYSTERECTOMY, BILATERAL SALPINGO-OOPHORECTOMY, LYSIS OF ADHESIONS, Bilateral - Abdomen    Surgeon: Surgeon(s) and Role:     * George Mena MD - Primary  Anesthesia: General   Estimated Blood Loss: Minimal    Drains: None  Specimens:   ID Type Source Tests Collected by Time Destination   1 : UTERUS, CERVIX, BILATERAL FALLOPIAN TUBES & OVARIES Tissue Uterus, Cervix, Bilateral Fallopian Tubes & Ovaries SURGICAL PATHOLOGY EXAM George Mena MD 3/20/2024  2:33 PM      Findings:   None.  Complications: None.  Implants: * No implants in log *

## 2024-03-20 NOTE — H&P
History and Physical Update    I have examined the patient and reviewed the history and physical that is present on this chart. The changes in the patient's history and physical condition are as follows:    None    George Mena MD

## 2024-03-20 NOTE — ANESTHESIA CARE TRANSFER NOTE
Patient: Nadiya De La Torre    Procedure: Procedure(s):  ROBOTIC TOTAL LAPAROSCOPIC HYSTERECTOMY, BILATERAL SALPINGO-OOPHORECTOMY, LYSIS OF ADHESIONS       Diagnosis: Postmenopausal bleeding [N95.0]  Diagnosis Additional Information: No value filed.    Anesthesia Type:   General     Note:    Oropharynx: spontaneously breathing  Level of Consciousness: drowsy  Oxygen Supplementation: face mask  Level of Supplemental Oxygen (L/min / FiO2): 5  Independent Airway: airway patency satisfactory and stable  Dentition: dentition unchanged  Vital Signs Stable: post-procedure vital signs reviewed and stable  Report to RN Given: handoff report given  Patient transferred to: PACU    Handoff Report: Identifed the Patient, Identified the Reponsible Provider, Reviewed the pertinent medical history, Discussed the surgical course, Reviewed Intra-OP anesthesia mangement and issues during anesthesia, Set expectations for post-procedure period and Allowed opportunity for questions and acknowledgement of understanding      Vitals:  Vitals Value Taken Time   /49 03/20/24 1516   Temp 97.6 F    Pulse 92 03/20/24 1517   Resp 16    SpO2 97 % on 5 L Mask 03/20/24 1517   Vitals shown include unfiled device data.    Electronically Signed By: TAE Grimes CRNA  March 20, 2024  3:19 PM

## 2024-03-20 NOTE — ANESTHESIA POSTPROCEDURE EVALUATION
Patient: Nadiya De La Torre    Procedure: Procedure(s):  ROBOTIC TOTAL LAPAROSCOPIC HYSTERECTOMY, BILATERAL SALPINGO-OOPHORECTOMY, LYSIS OF ADHESIONS       Anesthesia Type:  General    Note:  Disposition: Outpatient   Postop Pain Control: Uneventful            Sign Out: Well controlled pain   PONV:    Neuro/Psych: Uneventful            Sign Out: Acceptable/Baseline neuro status   Airway/Respiratory: Uneventful            Sign Out: Acceptable/Baseline resp. status   CV/Hemodynamics: Uneventful            Sign Out: Acceptable CV status; No obvious hypovolemia; No obvious fluid overload   Other NRE: NONE   DID A NON-ROUTINE EVENT OCCUR? No           Last vitals:  Vitals Value Taken Time   /65 03/20/24 1615   Temp 36.4  C (97.6  F) 03/20/24 1513   Pulse 94 03/20/24 1619   Resp 26 03/20/24 1619   SpO2 98 % 03/20/24 1619   Vitals shown include unfiled device data.    Electronically Signed By: Sanju Wei MD  March 20, 2024  4:24 PM

## 2024-03-20 NOTE — ANESTHESIA PREPROCEDURE EVALUATION
Anesthesia Pre-Procedure Evaluation    Patient: Nadiya De La Torre   MRN: [id number] : 1965        Procedure : Procedure(s):  ROBOTIC TOTAL LAPAROSCOPIC HYSTERECTOMY, BILATERAL SALPINGO-OOPHORECTOMY          Past Medical History:   Diagnosis Date    Abscess of Bartholin's gland     Arthritis 10 years ?    osteoarthritis in knee    Chronic reflux esophagitis     Fibrocystic breast     Obesity     Postmenopausal bleeding     Prediabetes     Recto-vaginal fistula       Past Surgical History:   Procedure Laterality Date    BIOPSY  2021    uterus    DILATION AND CURETTAGE, OPERATIVE HYSTEROSCOPY, COMBINED N/A 6/15/2023    Procedure: HYSTEROSCOPY, WITH DILATION AND CURETTAGE WITH PAPANCOLAOU SMEAR;  Surgeon: Aleida Duff MD;  Location: [facility] OR    [place] DRAIN PILONIDAL CYST SIMPL       Incision And Drainage Of Pilonidal Cyst, Simple;  Recorded: 2010;    [place] HYSTEROSCOPY,W/ENDO BX N/A 2021    Procedure: EXAM UNDER ANESTHESIA, PAPANCOLAOU SMEAR, HYSTEROSCOPY, WITH DILATION AND CURETTAGE;  Surgeon: Aleida Duff MD;  Location: [facility];  Service: Gynecology    [place] I&D BARTHOLIN GLAND ABSCESS  2001    REMV PILONIDAL LESION SIMPLE      Pilonidal Cyst Resection;  Recorded: 2010;    WISDOM TOOTH EXTRACTION        Allergies   Allergen Reactions    Soap      [place]    Sulfa Antibiotics Unknown      Social History     Tobacco Use    Smoking status: Never    Smokeless tobacco: Never   Substance Use Topics    Alcohol use: Yes     Comment: occasional      Wt Readings from Last 1 Encounters:   24 98.4 kg (217 lb)        Anesthesia Evaluation   Pt has had prior anesthetic.         ROS/MED HX  ENT/Pulmonary:  - neg pulmonary ROS     Neurologic:  - neg neurologic ROS     Cardiovascular:       METS/Exercise Tolerance:     Hematologic:  - neg hematologic  ROS     Musculoskeletal:  - neg musculoskeletal ROS     GI/Hepatic:     (+) GERD,                   Renal/Genitourinary:  " - neg Renal ROS     Endo:     (+)               Obesity,       Psychiatric/Substance Use:  - neg psychiatric ROS     Infectious Disease:  - neg infectious disease ROS     Malignancy:       Other:  - neg other ROS          Physical Exam    Airway  airway exam normal      Mallampati: II   TM distance: > 3 FB   Neck ROM: full   Mouth opening: > 3 cm    Respiratory Devices and Support         Dental       (+) Minor Abnormalities - some fillings, tiny chips      Cardiovascular   cardiovascular exam normal       Rhythm and rate: regular and normal     Pulmonary   pulmonary exam normal        breath sounds clear to auscultation           OUTSIDE LABS:  CBC:   Lab Results   Component Value Date    WBC 9.3 03/06/2024    WBC 10.3 06/12/2023    HGB 13.4 03/06/2024    HGB 13.8 06/12/2023    HCT 39.6 03/06/2024    HCT 40.2 06/12/2023     03/06/2024     06/12/2023     BMP:   Lab Results   Component Value Date     03/06/2024     06/12/2023    POTASSIUM 4.2 03/06/2024    POTASSIUM 4.1 06/12/2023    CHLORIDE 101 03/06/2024    CHLORIDE 101 06/12/2023    CO2 24 03/06/2024    CO2 24 06/12/2023    BUN 10.2 03/06/2024    BUN 10.0 06/12/2023    CR 0.69 03/06/2024    CR 0.71 06/12/2023     (H) 03/06/2024     (H) 06/12/2023     COAGS: No results found for: \"PTT\", \"INR\", \"FIBR\"  POC: No results found for: \"BGM\", \"HCG\", \"HCGS\"  HEPATIC: No results found for: \"ALBUMIN\", \"PROTTOTAL\", \"ALT\", \"AST\", \"GGT\", \"ALKPHOS\", \"BILITOTAL\", \"BILIDIRECT\", \"MICK\"  OTHER:   Lab Results   Component Value Date    A1C 6.2 (H) 03/06/2024    JASMEET 9.3 03/06/2024    TSH 2.09 01/17/2024       Anesthesia Plan    ASA Status:  2    NPO Status:  NPO Appropriate    Anesthesia Type: General.     - Airway: ETT   Induction: Intravenous, Propofol.   Maintenance: Balanced.        Consents    Anesthesia Plan(s) and associated risks, benefits, and realistic alternatives discussed. Questions answered and patient/representative(s) expressed " "understanding.     - Discussed: Risks, Benefits and Alternatives for BOTH SEDATION and the PROCEDURE were discussed     - Discussed with:  Patient, Spouse            Postoperative Care    Pain management: IV analgesics, Oral pain medications, Multi-modal analgesia.   PONV prophylaxis: Ondansetron (or other 5HT-3), Dexamethasone or Solumedrol, Background Propofol Infusion     Comments:               Sanju Wei MD    I have reviewed the pertinent notes and labs in the chart from the past 30 days and (re)examined the patient.  Any updates or changes from those notes are reflected in this note.             # Drug Induced Platelet Defect: home medication list includes an antiplatelet medication  # Obesity: Estimated body mass index is 39.69 kg/m  as calculated from the following:    Height as of this encounter: 1.575 m (5' 2\").    Weight as of this encounter: 98.4 kg (217 lb).      "

## 2024-03-20 NOTE — PHARMACY-ADMISSION MEDICATION HISTORY
Pharmacist Admission Medication History    Admission medication history is complete. The information provided in this note is only as accurate as the sources available at the time of the update.    Information Source(s): Patient, Family member, Clinic records, and CareEverywhere/SureScripts via in-person    Pertinent Information: has RX's for cephalexin (chronic rectal fistula that if it flares )and fluconazole-(yeast infection)- will fill if traveling     Changes made to PTA medication list:  Added: None  Deleted: None  Changed: None    Allergies reviewed with patient and updates made in EHR: yes    Medication History Completed By: Teodoro Drew RPH 3/20/2024 5:28 PM    PTA Med List   Medication Sig Note Last Dose    aspirin 81 MG EC tablet Take 81 mg by mouth daily  3/13/2024    calcium carbonate/vitamin D3 (CALCIUM 600 + D,3, ORAL) [CALCIUM CARBONATE/VITAMIN D3 (CALCIUM 600 + D,3, ORAL)] Take by mouth.  3/19/2024    cephALEXin (KEFLEX) 500 MG capsule Take 1 capsule (500 mg) by mouth 4 times daily (Patient taking differently: Take 500 mg by mouth 4 times daily as needed 500mg qid x 10days as needed) 3/20/2024: As needed for chronic rectal fistula that if it flares  More than a month    fluconazole (DIFLUCAN) 150 MG tablet Take 150 mg by mouth as needed  Take 1 tablet by mouth once for 1 dose, May repeat in 24 hours if not improving.* 3/20/2024: As needed for yeast infection More than a month    mv-mn/folic ac/calcium/vit K1 (WOMEN'S 50 PLUS MULTIVITAMIN ORAL) [MV-MN/FOLIC AC/CALCIUM/VIT K1 (WOMEN'S 50 PLUS MULTIVITAMIN ORAL)] Take by mouth.  3/19/2024    omeprazole (PRILOSEC) 20 MG DR capsule [OMEPRAZOLE (PRILOSEC) 20 MG CAPSULE] TAKE 1 CAPSULE BY MOUTH ONE TIME DAILY BEFORE A MEAL  3/19/2024    oxyCODONE (ROXICODONE) 5 MG tablet Take 1-2 tablets (5-10 mg) by mouth every 4 hours as needed for moderate to severe pain

## 2024-03-20 NOTE — ANESTHESIA PROCEDURE NOTES
Airway       Patient location during procedure: OR       Procedure Start/Stop Times: 3/20/2024 1:49 PM and 3/20/2024 1:50 PM  Staff -        CRNA: Renard Cloud APRN CRNA       Performed By: CRNA  Consent for Airway        Urgency: elective  Indications and Patient Condition       Indications for airway management: patrizia-procedural       Induction type:intravenous       Mask difficulty assessment: 2 - vent by mask + OA or adjuvant +/- NMBA    Final Airway Details       Final airway type: endotracheal airway       Successful airway: ETT - single  Endotracheal Airway Details        ETT size (mm): 7.0       Cuffed: yes       Successful intubation technique: video laryngoscopy       VL Blade Size: MAC 3       Grade View of Cords: 1       Adjucts: stylet       Position: Center       Measured from: gums/teeth       Secured at (cm): 22    Post intubation assessment        Placement verified by: capnometry, equal breath sounds and chest rise        Number of attempts at approach: 2       Number of other approaches attempted: 1 (Direct Laryngoscopy.  Gr. 3 view.  DL aborted.  Requested glidescope in room.)       Secured with: tape       Ease of procedure: easy       Dentition: Intact       Dental guard used and removed. Dental Guard Type: Standard White.    Medication(s) Administered   Medication Administration Time: 3/20/2024 1:49 PM    Additional Comments       Gr. 1 view.  Glidescope.  Easy bag-mask with oral airway.  No dental damage.  No oral airway.

## 2024-03-21 LAB
PATH REPORT.COMMENTS IMP SPEC: NORMAL
PATH REPORT.COMMENTS IMP SPEC: NORMAL
PATH REPORT.FINAL DX SPEC: NORMAL
PATH REPORT.GROSS SPEC: NORMAL
PATH REPORT.MICROSCOPIC SPEC OTHER STN: NORMAL
PATH REPORT.RELEVANT HX SPEC: NORMAL
PHOTO IMAGE: NORMAL

## 2024-03-21 PROCEDURE — 88307 TISSUE EXAM BY PATHOLOGIST: CPT | Mod: 26 | Performed by: PATHOLOGY

## 2024-03-23 NOTE — OP NOTE
Name:  Nadiya De La Torre  Record # [id number]   : 1965  Care Provider: George Mena MD   Admit Date:  3/20/2024       Obstetrics Gynecology - Operative Report    DATE OF SERVICE: 3/20/2024     PREOPERATIVE DIAGNOSIS:  Persistent postmenopausal bleeding, rectovaginal fistula    Postoperative diagnosis: Pelvic adhesions, no evidence of rectovaginal fistula.    PROCEDURE:   Da Medardo total laparoscopic assisted hysterectomy, bilateral salpingectomy, lysis of adhesions    FINDINGS: No fistula  was noted .  Uterus was small both tubes and ovaries and ovaries appeared normal.  There were no excrescences papillations or other abnormalities on the peritoneum.  Upper abdomen appeared normal.  There were filmy adhesions between the bladder and the uterus.  The patient had a history of a small lower genital tract rectovaginal fistula which was not identifiable under anesthesia.    PHYSICIAN: George Mena MD  Urogynecology and Reconstructive Pelvic Surgery      ASSISTANT: SHAYLEE Goldman      ESTIMATED BLOOD LOSS: Minimal ml    ANESTHESIA:  General.    SPECIMENS REMOVED:  Uterus, cervix and bilateral tubes, and ovaries    COMPLICATIONS: None noted.    COMMENTS: Nadiya De La Torre  was met preoperatively with her  where we discussed the procedure and the risks associated with the procedure.  She understood these to be, but not limited to injury to adjacent organs including bladder, bowel, ureter, infection and bleeding.  Patient signed consent and was brought back to the operating room in stable condition.  She had undergone multiple hysteroscopy's and D&Cs because of the postmenopausal bleeding but her bleeding has persisted.  She therefore desired definitive treatment in the form of the above.    Patient underwent induction of a general anesthetic, she was carefully prepped and draped for the procedure in sterile fashion. Timeout was performed. Bladder was drained with a Senior catheter, uterine  manipulator placed into the uterus.     Attention was turned to the abdomen.  An incision was made above the umbilicus.  A Veress needle was introduced with a 2 pop technique, saline drop test confirmed adequate placement. Opening pressure was 4.   Insufflation was now done until 15mm of pressure were established.  An 11/12 nonbladed trocar was placed above the umbilicus. Two robotic assist ports were place approximately 9-10 cm lateral to this initial incision.  A right upper quadrant 10/11 nonbladed trocar was placed and a right lateral quadrant 5 mm trocar was placed. Trandelenburg assistance was used and the AVOS Cloudinci was then brought to the patient s bedside.  The da Medardo was then docked.  The PK bipolar forceps were placed at the left da Medardo port, the monopolar scissors placed in right side of the body and I took my turn to the da Medardo console.     The procedure began with identifying the normal anatomy.  The uterus appeared normal and was mobile.  The tube on the left side was cauterized, transected and removed.  The ovarian ligament on the left side was then cauterized and transected.  The round ligament on the left side was cauterized and transected creating an anterior and posterior leave of the broad ligament. This was carried out in similar fashion on the right side.  The anterior bladder flap was created.  The ureter was then identified and its course where it dives under the uterine vasculature.  This was repeated on the right side.  At this junction, the uterine vasculature on both sides near the uterocervical isthmus were cauterized and transected.  This cauterization and transection was continued along the cervix until the level of the cardinal ligament.  At this junction anterior colpotomy and posterior colpotomy were performed. The uterus, cervix and tubes were then delivered through the vagina.  The vaginal cuff was closed with V-Loc suture.  Both angles were elevated to its ipsilateral  uterosacral ligament.    At this junction, the procedure  was complete.  Sponge, lap and needle counts were correct x 2.  I took my turn to cystoscopy, noting normal ureter and bladder anatomy. Strong urine efflux bilaterally was noted from bilateral ureteral orfices.    The trocars were removed and the ruben was removed from the abdomen.  The fascia was closed with umbilical and upper right quadrant port with 0 Vicryl.  Skin was closed with Monocryl and Dermabond. Steri-Strips applied.  She was brought to the recovery in stable condition.    George Mena MD  Urogynecology and Reconstructive Pelvic Surgery      Cc: George Mena MD  Urogynecology and Reconstructive Pelvic Surgery

## 2024-05-15 ENCOUNTER — ANCILLARY PROCEDURE (OUTPATIENT)
Dept: MAMMOGRAPHY | Facility: CLINIC | Age: 59
End: 2024-05-15
Attending: PHYSICIAN ASSISTANT
Payer: COMMERCIAL

## 2024-05-15 DIAGNOSIS — Z12.31 VISIT FOR SCREENING MAMMOGRAM: ICD-10-CM

## 2024-05-15 PROCEDURE — 77067 SCR MAMMO BI INCL CAD: CPT | Mod: TC | Performed by: RADIOLOGY

## 2024-07-05 DIAGNOSIS — K21.00 GASTROESOPHAGEAL REFLUX DISEASE WITH ESOPHAGITIS WITHOUT HEMORRHAGE: ICD-10-CM

## 2024-07-08 ENCOUNTER — VIRTUAL VISIT (OUTPATIENT)
Dept: FAMILY MEDICINE | Facility: CLINIC | Age: 59
End: 2024-07-08
Payer: COMMERCIAL

## 2024-07-08 DIAGNOSIS — K21.00 GASTROESOPHAGEAL REFLUX DISEASE WITH ESOPHAGITIS WITHOUT HEMORRHAGE: ICD-10-CM

## 2024-07-08 PROCEDURE — 99213 OFFICE O/P EST LOW 20 MIN: CPT | Mod: 95 | Performed by: PHYSICIAN ASSISTANT

## 2024-07-08 NOTE — PROGRESS NOTES
"Nadiya is a 58 year old who is being evaluated via a billable video visit.    How would you like to obtain your AVS? MyChart  If the video visit is dropped, the invitation should be resent by: Text to cell phone: [phone]  Will anyone else be joining your video visit? No    MY CHART  Assessment & Plan     Gastroesophageal reflux disease with esophagitis without hemorrhage  Stable, no concerns.  Refilling x 1 year.  Discussed lifestyle changes as well today.  - omeprazole (PRILOSEC) 20 MG DR capsule; TAKE 1 CAPSULE BY MOUTH ONE TIME DAILY before a meal          BMI  Estimated body mass index is 39.69 kg/m  as calculated from the following:    Height as of 3/20/24: 1.575 m (5' 2\").    Weight as of 3/20/24: 98.4 kg (217 lb).   Weight management plan: Discussed healthy diet and exercise guidelines          Subjective   Nadiya is a 58 year old, presenting for the following health issues:  Recheck Medication, Gastrophageal Reflux, and Video Visit    History of Present Illness       Reason for visit:  Med check    She eats 2-3 servings of fruits and vegetables daily.She consumes 0 sweetened beverage(s) daily.She exercises with enough effort to increase her heart rate 20 to 29 minutes per day.  She exercises with enough effort to increase her heart rate 4 days per week.   She is taking medications regularly.       GERD/Heartburn  Onset/Duration: ongoing   Description:   Intensity: No symptoms while taking the medication.    Progression of Symptoms: improving  Accompanying Signs & Symptoms:  Does it feel like food gets stuck or trouble swallowing: No  Nausea: YES  Vomiting (bloody?): No  Abdominal Pain: No  Black-Tarry stools: No  Bloody stools: No  History:  Previous similar episodes: YES  Previous ulcers: No  Precipitating factors:   Caffeine use: YES  Alcohol use: No  NSAID/Aspirin use: No  Tobacco use: No  Worse with creamy foods.  Alleviating factors: omeprazole   Therapies tried and outcome:             Lifestyle " changes: walk more             Medications: Omeprazole (Prilosec) and medication helpful    Things are going well.  As long as she takes it she does not experience heart burn.  Had an Endoscopy in 2017 with [place], colonoscopy 2014.  No other concerns today.      Review of Systems  Constitutional, HEENT, cardiovascular, pulmonary, gi and gu systems are negative, except as otherwise noted.      Objective    Vitals - Patient Reported  Weight (Patient Reported): 99.8 kg (220 lb)  Pain Score: No Pain (0)        Physical Exam   GENERAL: alert and no distress  EYES: Eyes grossly normal to inspection.  No discharge or erythema, or obvious scleral/conjunctival abnormalities.  RESP: No audible wheeze, cough, or visible cyanosis.    SKIN: Visible skin clear. No significant rash, abnormal pigmentation or lesions.  NEURO: Cranial nerves grossly intact.  Mentation and speech appropriate for age.  PSYCH: Appropriate affect, tone, and pace of words          Video-Visit Details    Type of service:  Video Visit   Originating Location (pt. Location): Home    Distant Location (provider location):  On-site  Platform used for Video Visit: Colten  Signed Electronically by: Silas Castillo PA-C

## 2024-09-22 ENCOUNTER — HEALTH MAINTENANCE LETTER (OUTPATIENT)
Age: 59
End: 2024-09-22

## 2024-12-19 ENCOUNTER — NURSE TRIAGE (OUTPATIENT)
Dept: FAMILY MEDICINE | Facility: CLINIC | Age: 59
End: 2024-12-19
Payer: COMMERCIAL

## 2024-12-19 NOTE — TELEPHONE ENCOUNTER
Patient calling with cold symptoms. Running nose, sore throat. That started on Dec. 8th.   Cough is symptom most bothersome, dry cough. Denies chest pain or shortness of breath. No fevers.   Dayquil and nyquil not helpful for cough  Negative for COVID with home test.    Recommend evaluation today in urgent care. Patient agreeable to plan.  Reason for Disposition   SEVERE coughing spells (e.g., whooping sound after coughing, vomiting after coughing)    Additional Information   Negative: Bluish (or gray) lips or face   Negative: SEVERE difficulty breathing (e.g., struggling for each breath, speaks in single words)   Negative: Rapid onset of cough and has hives   Negative: Coughing started suddenly after medicine, an allergic food or bee sting   Negative: Difficulty breathing after exposure to flames, smoke, or fumes   Negative: Sounds like a life-threatening emergency to the triager   Negative: Previous asthma attacks and this feels like asthma attack   Negative: Dry cough (non-productive; no sputum or minimal clear sputum) and within 14 days of COVID-19 Exposure   Negative: Patient sounds very sick or weak to the triager   Negative: MODERATE difficulty breathing (e.g., speaks in phrases, SOB even at rest, pulse 100-120) and still present when not coughing   Negative: Chest pain present when not coughing   Negative: Passed out (e.g., fainted, lost consciousness, blacked out and was not responding)   Negative: MILD difficulty breathing (e.g., minimal/no SOB at rest, SOB with walking, pulse <100) and still present when not coughing   Negative: Coughed up > 1 tablespoon (15 ml) blood (Exception: Blood-tinged sputum.)   Negative: Fever > 103 F (39.4 C)   Negative: Fever > 101 F (38.3 C) and over 60 years of age   Negative: Fever > 100 F (37.8 C) and has diabetes mellitus or a weak immune system (e.g., HIV positive, cancer chemotherapy, organ transplant, splenectomy, chronic steroids)   Negative: Fever > 100 F (37.8 C) and  bedridden (e.g., CVA, chronic illness, recovering from surgery)   Negative: Increasing ankle swelling   Negative: Wheezing is present    Protocols used: Cough-A-OH

## 2025-06-18 ENCOUNTER — MYC REFILL (OUTPATIENT)
Dept: FAMILY MEDICINE | Facility: CLINIC | Age: 60
End: 2025-06-18
Payer: COMMERCIAL

## 2025-06-18 DIAGNOSIS — K21.00 GASTROESOPHAGEAL REFLUX DISEASE WITH ESOPHAGITIS WITHOUT HEMORRHAGE: ICD-10-CM

## 2025-06-18 RX ORDER — OMEPRAZOLE 20 MG/1
CAPSULE, DELAYED RELEASE ORAL
Qty: 90 CAPSULE | Refills: 0 | Status: SHIPPED | OUTPATIENT
Start: 2025-06-18

## 2025-06-20 ENCOUNTER — TELEPHONE (OUTPATIENT)
Dept: FAMILY MEDICINE | Facility: CLINIC | Age: 60
End: 2025-06-20
Payer: COMMERCIAL

## 2025-06-20 DIAGNOSIS — K21.00 GASTROESOPHAGEAL REFLUX DISEASE WITH ESOPHAGITIS WITHOUT HEMORRHAGE: ICD-10-CM

## 2025-06-20 RX ORDER — OMEPRAZOLE 20 MG/1
CAPSULE, DELAYED RELEASE ORAL
Qty: 90 CAPSULE | Refills: 0 | Status: CANCELLED | OUTPATIENT
Start: 2025-06-20

## 2025-06-20 NOTE — TELEPHONE ENCOUNTER
Patient just picked up 3 month script however, a little confused why she doesn't have refills. Wondering if you would like to send in new script with refills. Otherwise I did let her know if she calls the pharmacy we can send a request one week prior to the next fill and get refills at that time.   Could we follow up with patient.    Lazara Camargo CPhT  [facility]  [address] [place] [address] [phone]

## 2025-06-23 NOTE — TELEPHONE ENCOUNTER
It was probably because she is due for her annual wellness.  Please help schedule appointment.    Silas

## (undated) DEVICE — SOL WATER IRRIG 1000ML BOTTLE 2F7114

## (undated) DEVICE — SUCTION MANIFOLD NEPTUNE 2 SYS 1 PORT 702-025-000

## (undated) DEVICE — DEVICE RUMI II KOH-EFFICIENT 3.5CM KC-RUMI-35

## (undated) DEVICE — SYR 50ML LL W/O NDL 309653

## (undated) DEVICE — GLOVE SURG PI ULTRA TOUCH M SZ 7-1/2 LF

## (undated) DEVICE — DEVICE RUMI II KOH-EFFICIENT 3.0CM KC-RUMI-30

## (undated) DEVICE — GLOVE SURG PI ULTRA TOUCH M SZ 7 LF 42670

## (undated) DEVICE — DAVINCI XI DRAPE COLUMN 470341

## (undated) DEVICE — TUBING FILTER TRI-LUMEN AIRSEAL ASC-EVAC1

## (undated) DEVICE — DAVINCI XI HANDPIECE ESU VESSEL SEALER 8MM EXT 480422

## (undated) DEVICE — PREP CHLORAPREP 26ML TINTED HI-LITE ORANGE 930815

## (undated) DEVICE — PREP POVIDONE-IODINE 7.5% SCRUB 4OZ BOTTLE MDS093945

## (undated) DEVICE — DAVINCI XI DRAPE ARM 470015

## (undated) DEVICE — LUBRICANT INST ELECTROLUBE EL101

## (undated) DEVICE — PREP POVIDONE-IODINE 10% SOLUTION 4OZ BOTTLE MDS093944

## (undated) DEVICE — LIGHT HANDLE X1 31140133

## (undated) DEVICE — UTERINE MANIPULATOR RUMI 6.7MMX8CM UMB678

## (undated) DEVICE — DRAPE SHEET TABLE COVER KC 42301*

## (undated) DEVICE — Device

## (undated) DEVICE — BLADE KNIFE SURG 11 371111

## (undated) DEVICE — SU WND CLOSURE VLOC 180 ABS 0 12" GS-21 VLOCL0316

## (undated) DEVICE — GOWN XXL 9575

## (undated) DEVICE — NDL INSUFFLATION 13GA 120MM C2201

## (undated) DEVICE — CUSTOM PACK DA VINCI GYN SMA5BDVHEA

## (undated) DEVICE — SUTURE MONOCRYL 3-0 18 PS2 UND MCP497G

## (undated) DEVICE — DRAPE U SPLIT 74X120" 29440

## (undated) DEVICE — ENDO OBTURATOR ACCESS PORT BLADELESS 8X100MM IAS8-100LP

## (undated) DEVICE — SU DERMABOND ADVANCED .7ML DNX12

## (undated) DEVICE — DAVINCI XI OBTURATOR BLADELESS 8MM 470359

## (undated) DEVICE — DAVINCI XI SEAL UNIVERSAL 5-12MM 470500

## (undated) DEVICE — BRIEF STRETCH XL MPS40

## (undated) DEVICE — DECANTER VIAL 2006S

## (undated) DEVICE — SUCTION STRYKERFLOW II 250-070-500

## (undated) DEVICE — CATH FOLEY 16FR 5ML LUBRICATH LATEX 0165L16

## (undated) DEVICE — PAD POS XL 1X20X40IN PINK PIGAZZI

## (undated) DEVICE — MAT FLOOR SURGICAL 40X38 0702140238

## (undated) DEVICE — SYR 50ML SLIP TIP W/O NDL 309654

## (undated) DEVICE — PROTECTOR ARM STANDARD ONE STEP

## (undated) DEVICE — DAVINCI HOT SHEARS TIP COVER  400180

## (undated) DEVICE — ANTIFOG SOLUTION SEE SHARP 150M TROCAR SWABS 30978

## (undated) DEVICE — GLOVE PI ULTRATCH M LF SZ 6.5 PF CUFF TEXT STRL LF 42665

## (undated) RX ORDER — PROPOFOL 10 MG/ML
INJECTION, EMULSION INTRAVENOUS
Status: DISPENSED
Start: 2024-03-20

## (undated) RX ORDER — BUPIVACAINE HYDROCHLORIDE 2.5 MG/ML
INJECTION, SOLUTION INFILTRATION; PERINEURAL
Status: DISPENSED
Start: 2024-03-20

## (undated) RX ORDER — FENTANYL CITRATE 50 UG/ML
INJECTION, SOLUTION INTRAMUSCULAR; INTRAVENOUS
Status: DISPENSED
Start: 2024-03-20

## (undated) RX ORDER — LIDOCAINE HYDROCHLORIDE 10 MG/ML
INJECTION, SOLUTION EPIDURAL; INFILTRATION; INTRACAUDAL; PERINEURAL
Status: DISPENSED
Start: 2024-03-20